# Patient Record
Sex: FEMALE | Race: WHITE | NOT HISPANIC OR LATINO | URBAN - METROPOLITAN AREA
[De-identification: names, ages, dates, MRNs, and addresses within clinical notes are randomized per-mention and may not be internally consistent; named-entity substitution may affect disease eponyms.]

---

## 2022-11-17 ENCOUNTER — INPATIENT (INPATIENT)
Facility: HOSPITAL | Age: 37
LOS: 0 days | Discharge: HOME | DRG: 37 | End: 2022-11-18
Attending: NEUROLOGICAL SURGERY | Admitting: NEUROLOGICAL SURGERY
Payer: MEDICAID

## 2022-11-17 ENCOUNTER — APPOINTMENT (OUTPATIENT)
Dept: NEUROSURGERY | Facility: HOSPITAL | Age: 37
End: 2022-11-17

## 2022-11-17 VITALS
OXYGEN SATURATION: 100 % | SYSTOLIC BLOOD PRESSURE: 107 MMHG | HEART RATE: 77 BPM | RESPIRATION RATE: 13 BRPM | DIASTOLIC BLOOD PRESSURE: 68 MMHG

## 2022-11-17 DIAGNOSIS — Z01.818 ENCOUNTER FOR OTHER PREPROCEDURAL EXAMINATION: ICD-10-CM

## 2022-11-17 DIAGNOSIS — Z29.9 ENCOUNTER FOR PROPHYLACTIC MEASURES, UNSPECIFIED: ICD-10-CM

## 2022-11-17 DIAGNOSIS — B06.00: ICD-10-CM

## 2022-11-17 DIAGNOSIS — I77.74 DISSECTION OF VERTEBRAL ARTERY: ICD-10-CM

## 2022-11-17 DIAGNOSIS — M48.9 SPONDYLOPATHY, UNSPECIFIED: ICD-10-CM

## 2022-11-17 LAB
ANION GAP SERPL CALC-SCNC: 10 MMOL/L — SIGNIFICANT CHANGE UP (ref 5–17)
ANION GAP SERPL CALC-SCNC: 14 MMOL/L — SIGNIFICANT CHANGE UP (ref 5–17)
APTT BLD: 131.2 SEC — CRITICAL HIGH (ref 27.5–35.5)
APTT BLD: 36.5 SEC — HIGH (ref 27.5–35.5)
APTT BLD: 80.7 SEC — HIGH (ref 27.5–35.5)
BLD GP AB SCN SERPL QL: NEGATIVE — SIGNIFICANT CHANGE UP
BUN SERPL-MCNC: 10 MG/DL — SIGNIFICANT CHANGE UP (ref 7–23)
BUN SERPL-MCNC: 13 MG/DL — SIGNIFICANT CHANGE UP (ref 7–23)
CALCIUM SERPL-MCNC: 8.5 MG/DL — SIGNIFICANT CHANGE UP (ref 8.4–10.5)
CALCIUM SERPL-MCNC: 9.7 MG/DL — SIGNIFICANT CHANGE UP (ref 8.4–10.5)
CHLORIDE SERPL-SCNC: 102 MMOL/L — SIGNIFICANT CHANGE UP (ref 96–108)
CHLORIDE SERPL-SCNC: 106 MMOL/L — SIGNIFICANT CHANGE UP (ref 96–108)
CO2 SERPL-SCNC: 22 MMOL/L — SIGNIFICANT CHANGE UP (ref 22–31)
CO2 SERPL-SCNC: 24 MMOL/L — SIGNIFICANT CHANGE UP (ref 22–31)
CREAT SERPL-MCNC: 0.6 MG/DL — SIGNIFICANT CHANGE UP (ref 0.5–1.3)
CREAT SERPL-MCNC: 0.63 MG/DL — SIGNIFICANT CHANGE UP (ref 0.5–1.3)
EGFR: 119 ML/MIN/1.73M2 — SIGNIFICANT CHANGE UP
EGFR: 120 ML/MIN/1.73M2 — SIGNIFICANT CHANGE UP
GLUCOSE SERPL-MCNC: 114 MG/DL — HIGH (ref 70–99)
GLUCOSE SERPL-MCNC: 139 MG/DL — HIGH (ref 70–99)
HCT VFR BLD CALC: 35.1 % — SIGNIFICANT CHANGE UP (ref 34.5–45)
HCT VFR BLD CALC: 38.8 % — SIGNIFICANT CHANGE UP (ref 34.5–45)
HCT VFR BLD CALC: 39.6 % — SIGNIFICANT CHANGE UP (ref 34.5–45)
HGB BLD-MCNC: 11.7 G/DL — SIGNIFICANT CHANGE UP (ref 11.5–15.5)
HGB BLD-MCNC: 12.8 G/DL — SIGNIFICANT CHANGE UP (ref 11.5–15.5)
HGB BLD-MCNC: 12.9 G/DL — SIGNIFICANT CHANGE UP (ref 11.5–15.5)
INR BLD: 1.14 RATIO — SIGNIFICANT CHANGE UP (ref 0.88–1.16)
INR BLD: 1.17 RATIO — HIGH (ref 0.88–1.16)
MAGNESIUM SERPL-MCNC: 2.1 MG/DL — SIGNIFICANT CHANGE UP (ref 1.6–2.6)
MCHC RBC-ENTMCNC: 29.4 PG — SIGNIFICANT CHANGE UP (ref 27–34)
MCHC RBC-ENTMCNC: 29.5 PG — SIGNIFICANT CHANGE UP (ref 27–34)
MCHC RBC-ENTMCNC: 30 PG — SIGNIFICANT CHANGE UP (ref 27–34)
MCHC RBC-ENTMCNC: 32.6 GM/DL — SIGNIFICANT CHANGE UP (ref 32–36)
MCHC RBC-ENTMCNC: 33 GM/DL — SIGNIFICANT CHANGE UP (ref 32–36)
MCHC RBC-ENTMCNC: 33.3 GM/DL — SIGNIFICANT CHANGE UP (ref 32–36)
MCV RBC AUTO: 89.4 FL — SIGNIFICANT CHANGE UP (ref 80–100)
MCV RBC AUTO: 90 FL — SIGNIFICANT CHANGE UP (ref 80–100)
MCV RBC AUTO: 90.2 FL — SIGNIFICANT CHANGE UP (ref 80–100)
NRBC # BLD: 0 /100 WBCS — SIGNIFICANT CHANGE UP (ref 0–0)
PA ADP PRP-ACNC: 157 PRU — LOW (ref 194–417)
PA ADP PRP-ACNC: 195 PRU — SIGNIFICANT CHANGE UP (ref 194–417)
PA ADP PRP-ACNC: 38 PRU — LOW (ref 194–417)
PHOSPHATE SERPL-MCNC: 3.4 MG/DL — SIGNIFICANT CHANGE UP (ref 2.5–4.5)
PLATELET # BLD AUTO: 215 K/UL — SIGNIFICANT CHANGE UP (ref 150–400)
PLATELET # BLD AUTO: 222 K/UL — SIGNIFICANT CHANGE UP (ref 150–400)
PLATELET # BLD AUTO: 250 K/UL — SIGNIFICANT CHANGE UP (ref 150–400)
PLATELET RESPONSE ASPIRIN RESULT: 406 ARU — SIGNIFICANT CHANGE UP (ref 350–700)
PLATELET RESPONSE ASPIRIN RESULT: 587 ARU — SIGNIFICANT CHANGE UP (ref 350–700)
POTASSIUM SERPL-MCNC: 3.8 MMOL/L — SIGNIFICANT CHANGE UP (ref 3.5–5.3)
POTASSIUM SERPL-MCNC: 4.1 MMOL/L — SIGNIFICANT CHANGE UP (ref 3.5–5.3)
POTASSIUM SERPL-SCNC: 3.8 MMOL/L — SIGNIFICANT CHANGE UP (ref 3.5–5.3)
POTASSIUM SERPL-SCNC: 4.1 MMOL/L — SIGNIFICANT CHANGE UP (ref 3.5–5.3)
PROTHROM AB SERPL-ACNC: 13.1 SEC — SIGNIFICANT CHANGE UP (ref 10.5–13.4)
PROTHROM AB SERPL-ACNC: 13.6 SEC — HIGH (ref 10.5–13.4)
RBC # BLD: 3.9 M/UL — SIGNIFICANT CHANGE UP (ref 3.8–5.2)
RBC # BLD: 4.34 M/UL — SIGNIFICANT CHANGE UP (ref 3.8–5.2)
RBC # BLD: 4.39 M/UL — SIGNIFICANT CHANGE UP (ref 3.8–5.2)
RBC # FLD: 12.3 % — SIGNIFICANT CHANGE UP (ref 10.3–14.5)
RBC # FLD: 12.4 % — SIGNIFICANT CHANGE UP (ref 10.3–14.5)
RBC # FLD: 12.7 % — SIGNIFICANT CHANGE UP (ref 10.3–14.5)
RH IG SCN BLD-IMP: POSITIVE — SIGNIFICANT CHANGE UP
SARS-COV-2 RNA SPEC QL NAA+PROBE: SIGNIFICANT CHANGE UP
SODIUM SERPL-SCNC: 138 MMOL/L — SIGNIFICANT CHANGE UP (ref 135–145)
SODIUM SERPL-SCNC: 140 MMOL/L — SIGNIFICANT CHANGE UP (ref 135–145)
UFH PPP CHRO-ACNC: 0.28 IU/ML — LOW (ref 0.3–0.7)
WBC # BLD: 5.96 K/UL — SIGNIFICANT CHANGE UP (ref 3.8–10.5)
WBC # BLD: 6.85 K/UL — SIGNIFICANT CHANGE UP (ref 3.8–10.5)
WBC # BLD: 7.82 K/UL — SIGNIFICANT CHANGE UP (ref 3.8–10.5)
WBC # FLD AUTO: 5.96 K/UL — SIGNIFICANT CHANGE UP (ref 3.8–10.5)
WBC # FLD AUTO: 6.85 K/UL — SIGNIFICANT CHANGE UP (ref 3.8–10.5)
WBC # FLD AUTO: 7.82 K/UL — SIGNIFICANT CHANGE UP (ref 3.8–10.5)

## 2022-11-17 PROCEDURE — 75894 X-RAYS TRANSCATH THERAPY: CPT | Mod: 26

## 2022-11-17 PROCEDURE — 75898 FOLLOW-UP ANGIOGRAPHY: CPT | Mod: 26

## 2022-11-17 PROCEDURE — 36226 PLACE CATH VERTEBRAL ART: CPT | Mod: 50

## 2022-11-17 PROCEDURE — 99223 1ST HOSP IP/OBS HIGH 75: CPT

## 2022-11-17 PROCEDURE — 61626 TCAT PERM OCCLS/EMBOL NONCNS: CPT

## 2022-11-17 PROCEDURE — 99291 CRITICAL CARE FIRST HOUR: CPT

## 2022-11-17 RX ORDER — ACETAMINOPHEN 500 MG
650 TABLET ORAL EVERY 6 HOURS
Refills: 0 | Status: DISCONTINUED | OUTPATIENT
Start: 2022-11-17 | End: 2022-11-18

## 2022-11-17 RX ORDER — OXYCODONE HYDROCHLORIDE 5 MG/1
10 TABLET ORAL EVERY 4 HOURS
Refills: 0 | Status: DISCONTINUED | OUTPATIENT
Start: 2022-11-17 | End: 2022-11-18

## 2022-11-17 RX ORDER — ACETAMINOPHEN 500 MG
1000 TABLET ORAL ONCE
Refills: 0 | Status: COMPLETED | OUTPATIENT
Start: 2022-11-17 | End: 2022-11-18

## 2022-11-17 RX ORDER — ASPIRIN/CALCIUM CARB/MAGNESIUM 324 MG
325 TABLET ORAL ONCE
Refills: 0 | Status: DISCONTINUED | OUTPATIENT
Start: 2022-11-17 | End: 2022-11-17

## 2022-11-17 RX ORDER — HYDROMORPHONE HYDROCHLORIDE 2 MG/ML
0.25 INJECTION INTRAMUSCULAR; INTRAVENOUS; SUBCUTANEOUS
Refills: 0 | Status: DISCONTINUED | OUTPATIENT
Start: 2022-11-17 | End: 2022-11-18

## 2022-11-17 RX ORDER — HEPARIN SODIUM 5000 [USP'U]/ML
1200 INJECTION INTRAVENOUS; SUBCUTANEOUS
Qty: 25000 | Refills: 0 | Status: DISCONTINUED | OUTPATIENT
Start: 2022-11-17 | End: 2022-11-18

## 2022-11-17 RX ORDER — ONDANSETRON 8 MG/1
4 TABLET, FILM COATED ORAL EVERY 6 HOURS
Refills: 0 | Status: DISCONTINUED | OUTPATIENT
Start: 2022-11-17 | End: 2022-11-18

## 2022-11-17 RX ORDER — CLOPIDOGREL BISULFATE 75 MG/1
300 TABLET, FILM COATED ORAL ONCE
Refills: 0 | Status: COMPLETED | OUTPATIENT
Start: 2022-11-17 | End: 2022-11-17

## 2022-11-17 RX ORDER — POLYETHYLENE GLYCOL 3350 17 G/17G
17 POWDER, FOR SOLUTION ORAL DAILY
Refills: 0 | Status: DISCONTINUED | OUTPATIENT
Start: 2022-11-17 | End: 2022-11-18

## 2022-11-17 RX ORDER — CANGRELOR 50 MG/1
2 INJECTION, POWDER, LYOPHILIZED, FOR SOLUTION INTRAVENOUS
Qty: 50 | Refills: 0 | Status: DISCONTINUED | OUTPATIENT
Start: 2022-11-17 | End: 2022-11-18

## 2022-11-17 RX ORDER — CLOPIDOGREL BISULFATE 75 MG/1
600 TABLET, FILM COATED ORAL ONCE
Refills: 0 | Status: COMPLETED | OUTPATIENT
Start: 2022-11-17 | End: 2022-11-17

## 2022-11-17 RX ORDER — ACETAMINOPHEN 500 MG
1000 TABLET ORAL ONCE
Refills: 0 | Status: COMPLETED | OUTPATIENT
Start: 2022-11-17 | End: 2022-11-17

## 2022-11-17 RX ORDER — SODIUM CHLORIDE 9 MG/ML
1000 INJECTION INTRAMUSCULAR; INTRAVENOUS; SUBCUTANEOUS
Refills: 0 | Status: DISCONTINUED | OUTPATIENT
Start: 2022-11-17 | End: 2022-11-18

## 2022-11-17 RX ORDER — SENNA PLUS 8.6 MG/1
2 TABLET ORAL AT BEDTIME
Refills: 0 | Status: DISCONTINUED | OUTPATIENT
Start: 2022-11-17 | End: 2022-11-18

## 2022-11-17 RX ORDER — ASPIRIN/CALCIUM CARB/MAGNESIUM 324 MG
650 TABLET ORAL ONCE
Refills: 0 | Status: COMPLETED | OUTPATIENT
Start: 2022-11-17 | End: 2022-11-17

## 2022-11-17 RX ORDER — OXYCODONE HYDROCHLORIDE 5 MG/1
5 TABLET ORAL EVERY 4 HOURS
Refills: 0 | Status: DISCONTINUED | OUTPATIENT
Start: 2022-11-17 | End: 2022-11-18

## 2022-11-17 RX ORDER — CLOPIDOGREL BISULFATE 75 MG/1
300 TABLET, FILM COATED ORAL ONCE
Refills: 0 | Status: DISCONTINUED | OUTPATIENT
Start: 2022-11-17 | End: 2022-11-17

## 2022-11-17 RX ORDER — CLOPIDOGREL BISULFATE 75 MG/1
75 TABLET, FILM COATED ORAL DAILY
Refills: 0 | Status: DISCONTINUED | OUTPATIENT
Start: 2022-11-17 | End: 2022-11-17

## 2022-11-17 RX ORDER — ASPIRIN/CALCIUM CARB/MAGNESIUM 324 MG
81 TABLET ORAL DAILY
Refills: 0 | Status: DISCONTINUED | OUTPATIENT
Start: 2022-11-17 | End: 2022-11-17

## 2022-11-17 RX ORDER — CANGRELOR 50 MG/1
2 INJECTION, POWDER, LYOPHILIZED, FOR SOLUTION INTRAVENOUS
Qty: 50 | Refills: 0 | Status: DISCONTINUED | OUTPATIENT
Start: 2022-11-17 | End: 2022-11-17

## 2022-11-17 RX ADMIN — Medication 650 MILLIGRAM(S): at 08:34

## 2022-11-17 RX ADMIN — Medication 400 MILLIGRAM(S): at 15:57

## 2022-11-17 RX ADMIN — CANGRELOR 42 MICROGRAM(S)/KG/MIN: 50 INJECTION, POWDER, LYOPHILIZED, FOR SOLUTION INTRAVENOUS at 23:53

## 2022-11-17 RX ADMIN — HEPARIN SODIUM 12 UNIT(S)/HR: 5000 INJECTION INTRAVENOUS; SUBCUTANEOUS at 06:26

## 2022-11-17 RX ADMIN — SODIUM CHLORIDE 75 MILLILITER(S): 9 INJECTION INTRAMUSCULAR; INTRAVENOUS; SUBCUTANEOUS at 19:05

## 2022-11-17 RX ADMIN — Medication 650 MILLIGRAM(S): at 23:57

## 2022-11-17 RX ADMIN — SODIUM CHLORIDE 75 MILLILITER(S): 9 INJECTION INTRAMUSCULAR; INTRAVENOUS; SUBCUTANEOUS at 06:49

## 2022-11-17 RX ADMIN — HEPARIN SODIUM 10 UNIT(S)/HR: 5000 INJECTION INTRAVENOUS; SUBCUTANEOUS at 19:04

## 2022-11-17 RX ADMIN — OXYCODONE HYDROCHLORIDE 5 MILLIGRAM(S): 5 TABLET ORAL at 06:57

## 2022-11-17 RX ADMIN — Medication 650 MILLIGRAM(S): at 22:57

## 2022-11-17 RX ADMIN — Medication 1000 MILLIGRAM(S): at 16:00

## 2022-11-17 RX ADMIN — SODIUM CHLORIDE 75 MILLILITER(S): 9 INJECTION INTRAMUSCULAR; INTRAVENOUS; SUBCUTANEOUS at 23:54

## 2022-11-17 RX ADMIN — CLOPIDOGREL BISULFATE 300 MILLIGRAM(S): 75 TABLET, FILM COATED ORAL at 19:11

## 2022-11-17 RX ADMIN — CLOPIDOGREL BISULFATE 600 MILLIGRAM(S): 75 TABLET, FILM COATED ORAL at 08:35

## 2022-11-17 RX ADMIN — HEPARIN SODIUM 12 UNIT(S)/HR: 5000 INJECTION INTRAVENOUS; SUBCUTANEOUS at 15:43

## 2022-11-17 RX ADMIN — OXYCODONE HYDROCHLORIDE 5 MILLIGRAM(S): 5 TABLET ORAL at 07:30

## 2022-11-17 NOTE — CHART NOTE - NSCHARTNOTEFT_GEN_A_CORE
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 Moreno MD  Fellow: Dr. Farmer   Physician Assistant: Bel Raphael PA-C    RN: Ritesh Benavides: Shaji     Anesthesia: Dr. Jennings    (general anesthesia)    I/Os:  Fluids:  Buckley:  Contrast:  Estimated Blood Loss: <10cc    Preliminary Report:  Under general anesthesia, using a 5Fr short sheath to the right groin examination of left vertebral artery/ left internal carotid artery/ left external carotid artery/ right vertebral artery/ right internal carotid artery/ right external carotid artery via selective cerebral angiography demonstrates ________. ( Official note to follow).    Patient tolerated procedure well, vital signs stable, hemodynamically stable, no change in neurological status compared to baseline. Results discussed with neurosurgery/ patient and their family. Groin sheath d/c'ed, manual compression held to hemostasis, no active bleeding, no hematoma, Vascade applied, quick clot and safeguard balloon dressing applied at _____h. Patient transferred to NSCU for further care/ monitoring. 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 Moreno MD  Fellow: Dr. Farmer   Physician Assistant: Bel Raphael PA-C    RN: Ritesh   Tech: Shaji     Anesthesia: Dr. Rosie Ambrocio     (general anesthesia)    I/Os:  Fluids: 1L  Buckley: 1300cc   Contrast: 169cc   Estimated Blood Loss: <10cc    Preliminary Report:  Under general anesthesia, using a 6Fr sheath to the right groin examination of left vertebral artery/  right vertebral artery via selective cerebral angiography demonstrates bilateral vertebral artery dissection s/p stenting and angioplasty. ( Official note to follow).    Patient tolerated procedure well, vital signs stable, hemodynamically stable, no change in neurological status compared to baseline. Results discussed with neurosurgery/ patient and their family. Groin sheath d/c'ed, manual compression held to hemostasis, no active bleeding, no hematoma, Vascade applied, quick clot and safeguard balloon dressing applied at 11:00h. Patient transferred to NSCU for further care/ monitoring.

## 2022-11-17 NOTE — PROGRESS NOTE ADULT - ASSESSMENT
ASSESSMENT/PLAN:     b/l vert dissection s/p pipeline stent    NEURO:  - Neurochecks q1h  - MRI brain WWO,. MR NOVA  - on ASA/PLAVIX, heparin, cangelor per nsg  - ARU/PRU now  - Pain control  - stroke consult  - Activity: bed rest for now    PULM:  - Incentive spirometry  - mobilize as tolerated  - Aspiration Precautions    CV:  - -140mmHg  - tte, ekg, a1c, lipid panel    RENAL:  - Fluids: IVF until good PO intake  - trend renal function  - d/c kirit in AM    GI:  - Diet: Dysphagia screen and then advance diet as tolerated  - GI prophylaxis: na  - Bowel regimen standing    ENDO:   - Goal euglycemia (-180)    HEME/ONC:  - monitor H/H    VTE prophylaxis   - SCDs   - hold chemoprophylaxis due to: heparin gtt aptt 60-90  - aptt q6h      ID:  - Madhavi-op antibiotics         ASSESSMENT/PLAN:     cerebellar stroke, b/l vert dissection s/p pipeline stent    NEURO:  - Neurochecks q1h  - MRI brain WWO,. MR NOVA  - on ASA/PLAVIX, heparin, cangelor per nsg  - ARU/PRU now  - Pain control  - stroke consult  - Activity: bed rest for now    PULM:  - Incentive spirometry  - mobilize as tolerated  - Aspiration Precautions    CV:  - -140 mmHg  - tte, ekg, a1c, lipid panel    RENAL:  - Fluids: IVF until good PO intake  - trend renal function  - d/c beth in AM    GI:  - Diet: Dysphagia screen and then advance diet as tolerated  - GI prophylaxis: na  - Bowel regimen standing    ENDO:   - Goal euglycemia (-180)    HEME/ONC:  - monitor H/H    VTE prophylaxis   - SCDs   - hold chemoprophylaxis due to: heparin gtt aptt 60-90  - aptt q6h      ID:  - Madhavi-op antibiotics        30 critical care time at risk for cerebellar hemorrhage, stroke

## 2022-11-17 NOTE — CONSULT NOTE ADULT - ASSESSMENT
37F hx chiropractic manipulation xfer from [place] w/ b/l vertevral dissection, planned for further neuro intervention, medicine consult  for Pre-op clearance

## 2022-11-17 NOTE — CHART NOTE - NSCHARTNOTESELECT_GEN_ALL_CORE
VTE risk PA/Event Note
Interventional Neuro Radiology/Event Note
Interventional Neuro Radiology/Event Note

## 2022-11-17 NOTE — PROGRESS NOTE ADULT - SUBJECTIVE AND OBJECTIVE BOX
HPI:  37F hx chiropractic manipulation xfer from [place] w/ b/l vert dissection. Yesterday noticed slurred speech, double vision, L sided numbness/tingling and R side felt "different." Now reports R sided pins/needles. Per patient had CT, angiogram, and MRI at [place] and Dr. Mayer has discs. Exam: AOx3, keep L eye closed to avoid double vision, PERRL, EOMI, no facial, no drift, KU 5/5.        24 HOUR EVENTS:   - POD0 s/p b/l vert dissection s/p pipeline stent            ICU Vital Signs Last 24 Hrs  T(C): 36.2 (17 Nov 2022 11:35), Max: 36.6 (17 Nov 2022 07:25)  T(F): 97.2 (17 Nov 2022 11:35), Max: 97.8 (17 Nov 2022 07:25)  HR: 96 (17 Nov 2022 12:30) (69 - 102)  BP: 103/62 (17 Nov 2022 12:30) (103/62 - 123/68)  BP(mean): 77 (17 Nov 2022 12:30) (77 - 98)  ABP: 104/65 (17 Nov 2022 12:30) (104/65 - 112/63)  ABP(mean): 80 (17 Nov 2022 12:30) (80 - 85)  RR: 16 (17 Nov 2022 12:30) (13 - 19)  SpO2: 97% (17 Nov 2022 12:30) (95% - 100%)    O2 Parameters below as of 17 Nov 2022 12:30  Patient On (Oxygen Delivery Method): room air           11-16 @ 07:01 - 11-17 @ 07:00  --------------------------------------------------------  IN: 514 mL / OUT: 0 mL / NET: 514 mL    11-17 @ 07:01 - 11-17 @ 13:19  --------------------------------------------------------  IN: 54 mL / OUT: 60 mL / NET: -6 mL                             12.8   5.96  )-----------( 250      ( 17 Nov 2022 02:14 )             38.8    11-17    140  |  102  |  13  ----------------------------<  114<H>  3.8   |  24  |  0.60    Ca    9.7      17 Nov 2022 04:42    TPro  6.9  /  Alb  4.1  /  TBili  0.3  /  DBili  <0.1  /  AST  16  /  ALT  16  /  AlkPhos  77  11-17              MEDICATIONS:  acetaminophen     Tablet .. 650 milliGRAM(s) Oral every 6 hours PRN  cangrelor Infusion 2 MICROgram(s)/kG/Min IV Continuous <Continuous>  heparin  Infusion 1200 Unit(s)/Hr IV Continuous <Continuous>  HYDROmorphone  Injectable 0.25 milliGRAM(s) IV Push every 10 minutes PRN  ondansetron Injectable 4 milliGRAM(s) IV Push every 6 hours PRN  oxyCODONE    IR 5 milliGRAM(s) Oral every 4 hours PRN  oxyCODONE    IR 10 milliGRAM(s) Oral every 4 hours PRN  polyethylene glycol 3350 17 Gram(s) Oral daily  senna 2 Tablet(s) Oral at bedtime  sodium chloride 0.9%. 1000 milliLiter(s) IV Continuous <Continuous>            PHYSICAL EXAM:    General: calm  CVS: RRR  Pulm: CTAB  GI: Soft, NTND  Extremities: No LE Edema  Neuro: AOx3, keeps L eye closed to resolve diplopia, slurred speech/dysarthria, follows commands, eomi, 3mm reactive b/l, no facial, LUE dysmetria, rest 5/5  Skin: no groin hematoma, +dp                 HPI:  37F hx chiropractic manipulation xfer from [place] w/ b/l vert dissection. Yesterday noticed slurred speech, double vision, L sided numbness/tingling and R side felt "different." Now reports R sided pins/needles. Per patient had CT, angiogram, and MRI at [place] and Dr. Mayer has discs. Exam: AOx3, keep L eye closed to avoid double vision, PERRL, EOMI, no facial, no drift, KU 5/5.        24 HOUR EVENTS:   - POD0 s/p b/l vert dissection s/p pipeline stent            ICU Vital Signs Last 24 Hrs  T(C): 36.2 (17 Nov 2022 11:35), Max: 36.6 (17 Nov 2022 07:25)  T(F): 97.2 (17 Nov 2022 11:35), Max: 97.8 (17 Nov 2022 07:25)  HR: 96 (17 Nov 2022 12:30) (69 - 102)  BP: 103/62 (17 Nov 2022 12:30) (103/62 - 123/68)  BP(mean): 77 (17 Nov 2022 12:30) (77 - 98)  ABP: 104/65 (17 Nov 2022 12:30) (104/65 - 112/63)  ABP(mean): 80 (17 Nov 2022 12:30) (80 - 85)  RR: 16 (17 Nov 2022 12:30) (13 - 19)  SpO2: 97% (17 Nov 2022 12:30) (95% - 100%)    O2 Parameters below as of 17 Nov 2022 12:30  Patient On (Oxygen Delivery Method): room air           11-16 @ 07:01 - 11-17 @ 07:00  --------------------------------------------------------  IN: 514 mL / OUT: 0 mL / NET: 514 mL    11-17 @ 07:01 - 11-17 @ 13:19  --------------------------------------------------------  IN: 54 mL / OUT: 60 mL / NET: -6 mL                             12.8   5.96  )-----------( 250      ( 17 Nov 2022 02:14 )             38.8    11-17    140  |  102  |  13  ----------------------------<  114<H>  3.8   |  24  |  0.60    Ca    9.7      17 Nov 2022 04:42    TPro  6.9  /  Alb  4.1  /  TBili  0.3  /  DBili  <0.1  /  AST  16  /  ALT  16  /  AlkPhos  77  11-17              MEDICATIONS:  acetaminophen     Tablet .. 650 milliGRAM(s) Oral every 6 hours PRN  cangrelor Infusion 2 MICROgram(s)/kG/Min IV Continuous <Continuous>  heparin  Infusion 1200 Unit(s)/Hr IV Continuous <Continuous>  HYDROmorphone  Injectable 0.25 milliGRAM(s) IV Push every 10 minutes PRN  ondansetron Injectable 4 milliGRAM(s) IV Push every 6 hours PRN  oxyCODONE    IR 5 milliGRAM(s) Oral every 4 hours PRN  oxyCODONE    IR 10 milliGRAM(s) Oral every 4 hours PRN  polyethylene glycol 3350 17 Gram(s) Oral daily  senna 2 Tablet(s) Oral at bedtime  sodium chloride 0.9%. 1000 milliLiter(s) IV Continuous <Continuous>            PHYSICAL EXAM:    General: calm  CVS: RRR  Pulm: CTAB  GI: Soft, NTND  Extremities: No LE Edema  Neuro: AOx3, diplopia, slurred speech/dysarthria, follows commands, eomi, 3mm reactive b/l, no facial, LUE dysmetria, rest 5/5  Skin: no groin hematoma, +dp      LABS:  Na: 140 (11-17 @ 04:42)  K: 3.8 (11-17 @ 04:42)  Cl: 102 (11-17 @ 04:42)  CO2: 24 (11-17 @ 04:42)  BUN: 13 (11-17 @ 04:42)  Cr: 0.60 (11-17 @ 04:42)  Glu: 114(11-17 @ 04:42)    Hgb: 12.8 (11-17 @ 02:14)  Hct: 38.8 (11-17 @ 02:14)  WBC: 5.96 (11-17 @ 02:14)  Plt: 250 (11-17 @ 02:14)    INR: 1.14 11-17-22 @ 02:14  PTT: 36.5 11-17-22 @ 02:14

## 2022-11-17 NOTE — H&P ADULT - ASSESSMENT
37F hx chiropractic manipulation xfer from [place] w/ b/l vert dissection. Yesterday noticed slurred speech, double vision, L sided numbness/tingling and R side felt "different." Now reports R sided pins/needles. Per patient had CT, angiogram, and MRI at [place] and Dr. Mayer has discs. Exam: AOx3, keep L eye closed to avoid double vision, PERRL, EOMI, no facial, no drift, KU 5/5.  -Adm stroke unit under Dr. Mayer, q2h neuro checks  -Preop angio/stent in AM  -Gave loading dose ASA, plavix 37F hx chiropractic manipulation xfer from [place] w/ b/l vert dissection. Yesterday noticed slurred speech, double vision, L sided numbness/tingling and R side felt "different." Now reports R sided pins/needles. Per patient had CT, angiogram, and MRI at [place] and Dr. Mayer has discs. Exam: AOx3, keep L eye closed to avoid double vision, PERRL, EOMI, no facial, no drift, KU 5/5.  -Adm stroke unit under Dr. Mayer, q2h neuro checks  -Preop angio/stent in AM  -Cont hep gtt, PTT goal 60-80  -Will discuss loading dose ASA, plavix w/ attending in AM

## 2022-11-17 NOTE — PROGRESS NOTE ADULT - SUBJECTIVE AND OBJECTIVE BOX
HPI:  37F hx chiropractic manipulation xfer from [place] w/ b/l vert dissection. Yesterday noticed slurred speech, double vision, L sided numbness/tingling and R side felt "different." Now reports R sided pins/needles. Per patient had CT, angiogram, and MRI at [place] and Dr. Mayer has discs. Exam: AOx3, keep L eye closed to avoid double vision, PERRL, EOMI, no facial, no drift, KU 5/5.        24 HOUR EVENTS:   - POD0 s/p b/l vert dissection s/p pipeline stent      ICU Vital Signs Last 24 Hrs  T(C): 36.2 (17 Nov 2022 11:35), Max: 36.6 (17 Nov 2022 07:25)  T(F): 97.2 (17 Nov 2022 11:35), Max: 97.8 (17 Nov 2022 07:25)  HR: 88 (17 Nov 2022 18:00) (69 - 102)  BP: 103/62 (17 Nov 2022 12:30) (103/62 - 123/68)  BP(mean): 77 (17 Nov 2022 12:30) (77 - 98)  ABP: 110/70 (17 Nov 2022 18:00) (102/62 - 137/86)  ABP(mean): 86 (17 Nov 2022 18:00) (78 - 106)  RR: 16 (17 Nov 2022 18:00) (13 - 19)  SpO2: 96% (17 Nov 2022 18:00) (94% - 100%)    O2 Parameters below as of 17 Nov 2022 18:00  Patient On (Oxygen Delivery Method): room air    I&O's Summary    16 Nov 2022 07:01  -  17 Nov 2022 07:00  --------------------------------------------------------  IN: 514 mL / OUT: 0 mL / NET: 514 mL    17 Nov 2022 07:01  -  17 Nov 2022 19:24  --------------------------------------------------------  IN: 948 mL / OUT: 440 mL / NET: 508 mL    MEDICATIONS  (STANDING):  cangrelor Infusion 2 MICROgram(s)/kG/Min (42 mL/Hr) IV Continuous <Continuous>  heparin  Infusion 1200 Unit(s)/Hr (10 mL/Hr) IV Continuous <Continuous>  polyethylene glycol 3350 17 Gram(s) Oral daily  senna 2 Tablet(s) Oral at bedtime  sodium chloride 0.9%. 1000 milliLiter(s) (75 mL/Hr) IV Continuous <Continuous>    MEDICATIONS  (PRN):  acetaminophen     Tablet .. 650 milliGRAM(s) Oral every 6 hours PRN Temp greater or equal to 38C (100.4F), Mild Pain (1 - 3)  HYDROmorphone  Injectable 0.25 milliGRAM(s) IV Push every 10 minutes PRN Moderate Pain (4 - 6)  ondansetron Injectable 4 milliGRAM(s) IV Push every 6 hours PRN Nausea and/or Vomiting  oxyCODONE    IR 5 milliGRAM(s) Oral every 4 hours PRN Moderate Pain (4 - 6)  oxyCODONE    IR 10 milliGRAM(s) Oral every 4 hours PRN Severe Pain (7 - 10)        PHYSICAL EXAM:    General: calm  CVS: RRR  Pulm: CTAB  GI: Soft, NTND  Extremities: No LE Edema  Neuro: AOx3, diplopia, slurred speech/dysarthria, follows commands, eomi, 3mm reactive b/l, no facial, LUE dysmetria, rest 5/5  Skin: no groin hematoma, +dp                     HPI:  37F hx chiropractic manipulation xfer from [place] w/ b/l vert dissection. Yesterday noticed slurred speech, double vision, L sided numbness/tingling and R side felt "different." Now reports R sided pins/needles. Per patient had CT, angiogram, and MRI at [place] and Dr. Mayer has discs. Exam: AOx3, keep L eye closed to avoid double vision, PERRL, EOMI, no facial, no drift, KU 5/5.        24 HOUR EVENTS:   - POD0 s/p b/l vert dissection s/p pipeline stent      ICU Vital Signs Last 24 Hrs  T(C): 36.2 (17 Nov 2022 11:35), Max: 36.6 (17 Nov 2022 07:25)  T(F): 97.2 (17 Nov 2022 11:35), Max: 97.8 (17 Nov 2022 07:25)  HR: 88 (17 Nov 2022 18:00) (69 - 102)  BP: 103/62 (17 Nov 2022 12:30) (103/62 - 123/68)  BP(mean): 77 (17 Nov 2022 12:30) (77 - 98)  ABP: 110/70 (17 Nov 2022 18:00) (102/62 - 137/86)  ABP(mean): 86 (17 Nov 2022 18:00) (78 - 106)  RR: 16 (17 Nov 2022 18:00) (13 - 19)  SpO2: 96% (17 Nov 2022 18:00) (94% - 100%)    O2 Parameters below as of 17 Nov 2022 18:00  Patient On (Oxygen Delivery Method): room air    I&O's Summary    16 Nov 2022 07:01  -  17 Nov 2022 07:00  --------------------------------------------------------  IN: 514 mL / OUT: 0 mL / NET: 514 mL    17 Nov 2022 07:01  -  17 Nov 2022 19:24  --------------------------------------------------------  IN: 948 mL / OUT: 440 mL / NET: 508 mL    MEDICATIONS  (STANDING):  cangrelor Infusion 2 MICROgram(s)/kG/Min (42 mL/Hr) IV Continuous <Continuous>  heparin  Infusion 1200 Unit(s)/Hr (10 mL/Hr) IV Continuous <Continuous>  polyethylene glycol 3350 17 Gram(s) Oral daily  senna 2 Tablet(s) Oral at bedtime  sodium chloride 0.9%. 1000 milliLiter(s) (75 mL/Hr) IV Continuous <Continuous>    MEDICATIONS  (PRN):  acetaminophen     Tablet .. 650 milliGRAM(s) Oral every 6 hours PRN Temp greater or equal to 38C (100.4F), Mild Pain (1 - 3)  HYDROmorphone  Injectable 0.25 milliGRAM(s) IV Push every 10 minutes PRN Moderate Pain (4 - 6)  ondansetron Injectable 4 milliGRAM(s) IV Push every 6 hours PRN Nausea and/or Vomiting  oxyCODONE    IR 5 milliGRAM(s) Oral every 4 hours PRN Moderate Pain (4 - 6)  oxyCODONE    IR 10 milliGRAM(s) Oral every 4 hours PRN Severe Pain (7 - 10)        PHYSICAL EXAM:    General: calm  CVS: RRR  Pulm: CTAB  GI: Soft, NTND  Extremities: No LE Edema  Neuro: AOx3, diplopia, slurred speech/dysarthria, follows commands, eomi, 3mm reactive b/l, no facial, LUE dysmetria worse than right upper extremity, paresthesia of the right sided body (face, arm, leg), rest 5/5  Skin: no groin hematoma, +dp                     HPI:  37F hx chiropractic manipulation xfer from [place] w/ b/l vert dissection. Yesterday noticed slurred speech, double vision, L sided numbness/tingling and R side felt "different." Now reports R sided pins/needles. Per patient had CT, angiogram, and MRI at [place] and Dr. Mayer has discs. Exam: AOx3, keep L eye closed to avoid double vision, PERRL, EOMI, no facial, no drift, KU 5/5.      24 HOUR EVENTS:   - POD0 s/p b/l vert dissection s/p pipeline stent  - Patient was loaded with 1 more dose of plavix after subtherapeutic levels, on cangrelor gtt. Will obtain another P2Y12 off cangrelor this AM      ICU Vital Signs Last 24 Hrs  T(C): 36.2 (17 Nov 2022 11:35), Max: 36.6 (17 Nov 2022 07:25)  T(F): 97.2 (17 Nov 2022 11:35), Max: 97.8 (17 Nov 2022 07:25)  HR: 88 (17 Nov 2022 18:00) (69 - 102)  BP: 103/62 (17 Nov 2022 12:30) (103/62 - 123/68)  BP(mean): 77 (17 Nov 2022 12:30) (77 - 98)  ABP: 110/70 (17 Nov 2022 18:00) (102/62 - 137/86)  ABP(mean): 86 (17 Nov 2022 18:00) (78 - 106)  RR: 16 (17 Nov 2022 18:00) (13 - 19)  SpO2: 96% (17 Nov 2022 18:00) (94% - 100%)    O2 Parameters below as of 17 Nov 2022 18:00  Patient On (Oxygen Delivery Method): room air    I&O's Summary    16 Nov 2022 07:01  -  17 Nov 2022 07:00  --------------------------------------------------------  IN: 514 mL / OUT: 0 mL / NET: 514 mL    17 Nov 2022 07:01  -  17 Nov 2022 19:24  --------------------------------------------------------  IN: 948 mL / OUT: 440 mL / NET: 508 mL    MEDICATIONS  (STANDING):  cangrelor Infusion 2 MICROgram(s)/kG/Min (42 mL/Hr) IV Continuous <Continuous>  heparin  Infusion 1200 Unit(s)/Hr (10 mL/Hr) IV Continuous <Continuous>  polyethylene glycol 3350 17 Gram(s) Oral daily  senna 2 Tablet(s) Oral at bedtime  sodium chloride 0.9%. 1000 milliLiter(s) (75 mL/Hr) IV Continuous <Continuous>    MEDICATIONS  (PRN):  acetaminophen     Tablet .. 650 milliGRAM(s) Oral every 6 hours PRN Temp greater or equal to 38C (100.4F), Mild Pain (1 - 3)  HYDROmorphone  Injectable 0.25 milliGRAM(s) IV Push every 10 minutes PRN Moderate Pain (4 - 6)  ondansetron Injectable 4 milliGRAM(s) IV Push every 6 hours PRN Nausea and/or Vomiting  oxyCODONE    IR 5 milliGRAM(s) Oral every 4 hours PRN Moderate Pain (4 - 6)  oxyCODONE    IR 10 milliGRAM(s) Oral every 4 hours PRN Severe Pain (7 - 10)        PHYSICAL EXAM:    General: calm  CVS: RRR  Pulm: CTAB  GI: Soft, NTND  Extremities: No LE Edema  Neuro: AOx3, diplopia, slurred speech/dysarthria, follows commands, eomi, 3mm reactive b/l, no facial, LUE dysmetria worse than right upper extremity, paresthesia of the right sided body (face, arm, leg), rest 5/5  Skin: no groin hematoma, +dp

## 2022-11-17 NOTE — H&P ADULT - HISTORY OF PRESENT ILLNESS
37F hx chiropractic manipulation xfer from [place] w/ b/l vert dissection. Yesterday noticed slurred speech, double vision, L sided numbness/tingling and R side felt "different." Now reports R sided pins/needles. Per patient had CT, angiogram, and MRI at [place] and Dr. Mayer has discs. Exam: AOx3, keep L eye closed to avoid double vision, PERRL, EOMI, no facial, no drift, KU 5/5.    ICU Vital Signs Last 24 Hrs  T(C): --  T(F): --  HR: --  BP: --  BP(mean): --  ABP: --  ABP(mean): --  RR: --  SpO2: --

## 2022-11-17 NOTE — CONSULT NOTE ADULT - PROBLEM SELECTOR RECOMMENDATION 2
Presents as transfer from [place] w/ constellation of neurologic findings   - Admitted to stroke unit    - further management per neuro    - Neuro checks per unit protocol   - Planned for Preop angio/stent in AM 37F no PMH p/w constellation of neurologic findings  Found to have b/l vertebral artery dissection  - No hx/o coagulopathy, no pertinent family hx  - Denies coagulopathy, no recent travel, illness, tick bite, trauma   - No prior reaction to anesthesia  - Able to perform > 4mets   - RCI 0, Class I risk   - Tele showing NSR  - cbc wnl  - cmp pending  - COVID obtained, results pending   - Upreg obtained results pending  - Active T&S   - If all pending blood work are norm, Pt medically cleared for procedure

## 2022-11-17 NOTE — CONSULT NOTE ADULT - PROBLEM SELECTOR RECOMMENDATION 9
37F no PMH p/w constellation of neurologic findings  Found to have b/l vertebral dissection  - No hx/o coagulopathy, no pertinent family hx  - Denies coagulopathy, no recent travel, illness, tick bite, trauma   - No prior reaction to anesthesia  - Able to perform > 4mets   - Tele showing NSR  - cbc wnl  - cmp pending  - COVID obtained, results pending   - Upreg obtained results pending  - Active T&S   - If all pending blood work are norm, Pt medically cleared for procedure 37F no PMH p/w constellation of neurologic findings  Found to have b/l vertebral dissection  - No hx/o coagulopathy, no pertinent family hx  - Denies coagulopathy, no recent travel, illness, tick bite, trauma   - No prior reaction to anesthesia  - Able to perform > 4mets   - RCI 0, Class I risk   - Tele showing NSR  - cbc wnl  - cmp pending  - COVID obtained, results pending   - Upreg obtained results pending  - Active T&S   - If all pending blood work are norm, Pt medically cleared for procedure Presents as transfer from [place] w/ constellation of neurologic findings   - Admitted to stroke unit    - further management per neuro    - Neuro checks per unit protocol   - Planned for Preop angio/stent in AM

## 2022-11-17 NOTE — CHART NOTE - NSCHARTNOTEFT_GEN_A_CORE
CAPRINI SCORE [CLOT]    AGE RELATED RISK FACTORS                                                       MOBILITY RELATED FACTORS  [ ] Age 41-60 years                                            (1 Point)                  [ ] Bed rest                                                        (1 Point)  [ ] Age: 61-74 years                                           (2 Points)                 [ ] Plaster cast                                                   (2 Points)  [ ] Age= 75 years                                              (3 Points)                 [ ] Bed bound for more than 72 hours                 (2 Points)    DISEASE RELATED RISK FACTORS                                               GENDER SPECIFIC FACTORS  [ ] Edema in the lower extremities                       (1 Point)                  [ ] Pregnancy                                                     (1 Point)  [ ] Varicose veins                                               (1 Point)                  [ ] Post-partum < 6 weeks                                   (1 Point)             [ ] BMI > 25 Kg/m2                                            (1 Point)                  [ ] Hormonal therapy  or oral contraception          (1 Point)                 [ ] Sepsis (in the previous month)                        (1 Point)                  [ ] History of pregnancy complications                 (1 point)  [ ] Pneumonia or serious lung disease                                               [ ] Unexplained or recurrent                     (1 Point)           (in the previous month)                               (1 Point)  [ ] Abnormal pulmonary function test                     (1 Point)                 SURGERY RELATED RISK FACTORS  [ ] Acute myocardial infarction                              (1 Point)                 [ ]  Section                                             (1 Point)  [ ] Congestive heart failure (in the previous month)  (1 Point)               [ ] Minor surgery                                                  (1 Point)   [ ] Inflammatory bowel disease                             (1 Point)                 [ ] Arthroscopic surgery                                        (2 Points)  [ ] Central venous access                                      (2 Points)                [ ] General surgery lasting more than 45 minutes   (2 Points)       [ ] Stroke (in the previous month)                          (5 Points)               [ ] Elective arthroplasty                                         (5 Points)                                                                                                                                               HEMATOLOGY RELATED FACTORS                                                 TRAUMA RELATED RISK FACTORS  [ ] Prior episodes of VTE                                     (3 Points)                [ ] Fracture of the hip, pelvis, or leg                       (5 Points)  [ ] Positive family history for VTE                         (3 Points)                 [ ] Acute spinal cord injury (in the previous month)  (5 Points)  [ ] Prothrombin 53534 A                                     (3 Points)                 [ ] Paralysis  (less than 1 month)                             (5 Points)  [ ] Factor V Leiden                                             (3 Points)                  [ ] Multiple Trauma within 1 month                        (5 Points)  [ ] Lupus anticoagulants                                     (3 Points)                                                           [ ] Anticardiolipin antibodies                               (3 Points)                                                       [ ] High homocysteine in the blood                      (3 Points)                                             [ ] Other congenital or acquired thrombophilia      (3 Points)                                                [ ] Heparin induced thrombocytopenia                  (3 Points)                                          Total Score [  0        ]    Caprini Score 0 - 2:  Low Risk, No VTE Prophylaxis required for most patients, encourage ambulation  Caprini Score 3 - 6:  At Risk, pharmacologic VTE prophylaxis is indicated for most patients (in the absence of a contraindication)  Caprini Score Greater than or = 7:  High Risk, pharmacologic VTE prophylaxis is indicated for most patients (in the absence of a contraindication)

## 2022-11-17 NOTE — CONSULT NOTE ADULT - SUBJECTIVE AND OBJECTIVE BOX
MEDICINE CONSULT NOTE    [facility] Division of Hospital Medicine  Adam Terrell MD  Av[employer]ible on [employer] Teams     HPI:  37F hx chiropractic manipulation xfer from [place] w/ b/l vert dissection. Yesterday noticed slurred speech, double vision, L sided numbness/tingling and R side felt "different." Now reports R sided pins/needles. Per patient had CT, angiogram, and MRI at [place] and Dr. Mayer has discs.    Medicine consult request for Pre-op clearance    At time of encounter, Pt laying comfortably in bed NAD, VSS    Continues to endorse slurred speech, double vision,  L sided numbness/tingling and R side felt "different."  Denies any trauma, recent travel, tick bite, fever, chills, CP, SOB, palpitation       PAST MEDICAL & SURGICAL HISTORY:  Denies PMH    S/p      Review of Systems:   CONSTITUTIONAL: No fever, chills  HEENT: No sore throat, reports double vision   RESPIRATORY: No cough, wheezing, shortness of breath  CARDIOVASCULAR: No chest pain, palpitations, leg edema  GASTROINTESTINAL: No abdominal pain, nausea, vomiting, diarrhea or constipation  GENITOURINARY: No dysuria, frequency, hematuria  NEUROLOGICAL: +slurred speech, +double vision, L sided numbness/tingling  R side feels "different."  SKIN: No itching, burning, rashes, or lesions   MUSCULOSKELETAL: No joint pain or swelling; No muscle, back, or extremity pain  PSYCHIATRIC: No depression, anxiety  HEME/LYMPH: No easy bruising, or bleeding gums      Allergies    No Known Allergies    Intolerances        Social History:   Denies S/A/D    FAMILY HISTORY:  Noncontributory       MEDICATIONS  (STANDING):  aspirin 325 milliGRAM(s) Oral once  clopidogrel Tablet 300 milliGRAM(s) Oral once  polyethylene glycol 3350 17 Gram(s) Oral daily  senna 2 Tablet(s) Oral at bedtime  sodium chloride 0.9%. 1000 milliLiter(s) (75 mL/Hr) IV Continuous <Continuous>    MEDICATIONS  (PRN):  acetaminophen     Tablet .. 650 milliGRAM(s) Oral every 6 hours PRN Temp greater or equal to 38C (100.4F), Mild Pain (1 - 3)  ondansetron Injectable 4 milliGRAM(s) IV Push every 6 hours PRN Nausea and/or Vomiting  oxyCODONE    IR 5 milliGRAM(s) Oral every 4 hours PRN Moderate Pain (4 - 6)  oxyCODONE    IR 10 milliGRAM(s) Oral every 4 hours PRN Severe Pain (7 - 10)        CAPILLARY BLOOD GLUCOSE        I&O's Summary      Physical Exam:  Vital Signs Last 24 Hrs  T(C): 36.2 (2022 02:00), Max: 36.2 (2022 02:00)  T(F): 97.1 (2022 02:00), Max: 97.1 (2022 02:00)  HR: 69 (2022 02:00) (69 - 77)  BP: 108/71 (2022 02:00) (107/68 - 108/71)  BP(mean): 83 (2022 02:00) (81 - 83)  RR: 17 (2022 02:00) (13 - 17)  SpO2: 97% (2022 02:00) (97% - 100%)    Parameters below as of 2022 02:00  Patient On (Oxygen Delivery Method): room air        CONSTITUTIONAL: NAD, well-groomed  EYES: PERRLA; conjunctiva and sclera clear, reports double vision so keeps left eye closed   ENMT: Moist oral mucosa, no pharyngeal injection or exudates; normal dentition  NECK: Supple, no palpable masses; no thyromegaly  RESPIRATORY: Normal respiratory effort; lungs are clear to auscultation bilaterally  CARDIOVASCULAR: RRR, normal S1/ S2, no M/R/G; No LE edema, 2+ peripheral pulse b/L  ABDOMEN: Nontender to palpation, normoactive bowel sounds, no rebound/guarding  MUSCULOSKELETAL:  No clubbing or cyanosis of digits; no joint swelling, no TTP   PSYCH: A+O to person, place, and time; affect appropriate  NEUROLOGY: +slurred speech, +double vision, L sided numbness/tingling  R side feels "different", strength 5/5 throughout, sensation intact, no facial drop   SKIN: No rashes; no palpable lesions    LABS:                        12.8   5.96  )-----------( 250      ( 2022 02:14 )             38.8           PT/INR - ( 2022 02:14 )   PT: 13.1 sec;   INR: 1.14 ratio         PTT - ( 2022 02:14 )  PTT:36.5 sec              RADIOLOGY & ADDITIONAL TESTS:  New Results Reviewed Today:   New Imaging Personally Reviewed Today:  New Electrocardiogram Personally Reviewed Today:  Prior or Outpatient Records Reviewed Today:    COMMUNICATION:  Care Discussed with Consultants/Other Providers and Details of Discussion:  Discussions with Patient/Family:  PCP Communication:

## 2022-11-17 NOTE — CHART NOTE - NSCHARTNOTEFT_GEN_A_CORE
Interventional Neuro Radiology  Pre-Procedure Note    This is a 37F hx chiropractic manipulation transfer from [place] w/ b/l vert dissection. Patient noticed slurred speech, double vision, L sided numbness/tingling and R side felt "different." Now reports R sided pins/needles. Per patient had CT, angiogram, and MRI at [place] and Dr. Mayer has discs. Patient presents now to neuro IR for selective cerebral angiogram and bilateral vertebral artery stenting.     Neuro Exam: AOx3, keep L eye closed to avoid double vision, PERRL, EOMI, no facial, no drift, KU 5/5    PAST MEDICAL & SURGICAL HISTORY:  None     Social History:   Denies tobacco use    FAMILY HISTORY:  No pertinent family history    Allergies:   No Known Allergies    Current Medications:   acetaminophen     Tablet .. 650 milliGRAM(s) Oral every 6 hours PRN  heparin  Infusion 1200 Unit(s)/Hr IV Continuous <Continuous>  ondansetron Injectable 4 milliGRAM(s) IV Push every 6 hours PRN  oxyCODONE    IR 5 milliGRAM(s) Oral every 4 hours PRN  oxyCODONE    IR 10 milliGRAM(s) Oral every 4 hours PRN  polyethylene glycol 3350 17 Gram(s) Oral daily  senna 2 Tablet(s) Oral at bedtime  sodium chloride 0.9%. 1000 milliLiter(s) IV Continuous <Continuous>      Labs:                         12.8   5.96  )-----------( 250      ( 17 Nov 2022 02:14 )             38.8       11-17    140  |  102  |  13  ----------------------------<  114<H>  3.8   |  24  |  0.60    Ca    9.7      17 Nov 2022 04:42    TPro  6.9  /  Alb  4.1  /  TBili  0.3  /  DBili  <0.1  /  AST  16  /  ALT  16  /  AlkPhos  77  11-17      HCG: negative     Blood Bank: 11-17-22  O  --  Positive      Assessment/Plan:   This is a 36yo female  presents with bilateral vertebral artery dissection. Patient presents to neuro-IR for selective cerebral angiography and stenting. Procedure/ risks/ benefits/ goals/ alternatives were explained. Risks include but are not limited to stroke/ vessel injury/ hemorrhage/ groin hematoma. All questions answered. Informed content obtained from patient. Consent placed in chart.

## 2022-11-18 ENCOUNTER — TRANSCRIPTION ENCOUNTER (OUTPATIENT)
Age: 37
End: 2022-11-18

## 2022-11-18 VITALS
OXYGEN SATURATION: 99 % | RESPIRATION RATE: 16 BRPM | HEART RATE: 89 BPM | SYSTOLIC BLOOD PRESSURE: 131 MMHG | DIASTOLIC BLOOD PRESSURE: 79 MMHG | TEMPERATURE: 98 F

## 2022-11-18 PROBLEM — Z00.00 ENCOUNTER FOR PREVENTIVE HEALTH EXAMINATION: Status: ACTIVE | Noted: 2022-11-18

## 2022-11-18 LAB
APTT BLD: 75.7 SEC — HIGH (ref 27.5–35.5)
HCT VFR BLD CALC: 35.5 % — SIGNIFICANT CHANGE UP (ref 34.5–45)
HGB BLD-MCNC: 11.8 G/DL — SIGNIFICANT CHANGE UP (ref 11.5–15.5)
MCHC RBC-ENTMCNC: 29.8 PG — SIGNIFICANT CHANGE UP (ref 27–34)
MCHC RBC-ENTMCNC: 33.2 GM/DL — SIGNIFICANT CHANGE UP (ref 32–36)
MCV RBC AUTO: 89.6 FL — SIGNIFICANT CHANGE UP (ref 80–100)
NRBC # BLD: 0 /100 WBCS — SIGNIFICANT CHANGE UP (ref 0–0)
PA ADP PRP-ACNC: 190 PRU — LOW (ref 194–417)
PA ADP PRP-ACNC: 47 PRU — LOW (ref 194–417)
PLATELET # BLD AUTO: 218 K/UL — SIGNIFICANT CHANGE UP (ref 150–400)
RBC # BLD: 3.96 M/UL — SIGNIFICANT CHANGE UP (ref 3.8–5.2)
RBC # FLD: 12.6 % — SIGNIFICANT CHANGE UP (ref 10.3–14.5)
WBC # BLD: 7.58 K/UL — SIGNIFICANT CHANGE UP (ref 3.8–10.5)
WBC # FLD AUTO: 7.58 K/UL — SIGNIFICANT CHANGE UP (ref 3.8–10.5)

## 2022-11-18 PROCEDURE — U0005: CPT

## 2022-11-18 PROCEDURE — C9460: CPT

## 2022-11-18 PROCEDURE — 85520 HEPARIN ASSAY: CPT

## 2022-11-18 PROCEDURE — 97165 OT EVAL LOW COMPLEX 30 MIN: CPT

## 2022-11-18 PROCEDURE — C1725: CPT

## 2022-11-18 PROCEDURE — 70544 MR ANGIOGRAPHY HEAD W/O DYE: CPT

## 2022-11-18 PROCEDURE — C1894: CPT

## 2022-11-18 PROCEDURE — 97162 PT EVAL MOD COMPLEX 30 MIN: CPT

## 2022-11-18 PROCEDURE — 85730 THROMBOPLASTIN TIME PARTIAL: CPT

## 2022-11-18 PROCEDURE — 84100 ASSAY OF PHOSPHORUS: CPT

## 2022-11-18 PROCEDURE — 75898 FOLLOW-UP ANGIOGRAPHY: CPT

## 2022-11-18 PROCEDURE — 36415 COLL VENOUS BLD VENIPUNCTURE: CPT

## 2022-11-18 PROCEDURE — 85610 PROTHROMBIN TIME: CPT

## 2022-11-18 PROCEDURE — C1760: CPT

## 2022-11-18 PROCEDURE — 85027 COMPLETE CBC AUTOMATED: CPT

## 2022-11-18 PROCEDURE — 70551 MRI BRAIN STEM W/O DYE: CPT

## 2022-11-18 PROCEDURE — 80048 BASIC METABOLIC PNL TOTAL CA: CPT

## 2022-11-18 PROCEDURE — 36226 PLACE CATH VERTEBRAL ART: CPT

## 2022-11-18 PROCEDURE — 80076 HEPATIC FUNCTION PANEL: CPT

## 2022-11-18 PROCEDURE — C1887: CPT

## 2022-11-18 PROCEDURE — 83735 ASSAY OF MAGNESIUM: CPT

## 2022-11-18 PROCEDURE — 70544 MR ANGIOGRAPHY HEAD W/O DYE: CPT | Mod: 26,59

## 2022-11-18 PROCEDURE — 61624 TCAT PERM OCCLS/EMBOLJ CNS: CPT

## 2022-11-18 PROCEDURE — C1769: CPT

## 2022-11-18 PROCEDURE — 99233 SBSQ HOSP IP/OBS HIGH 50: CPT

## 2022-11-18 PROCEDURE — 86900 BLOOD TYPING SEROLOGIC ABO: CPT

## 2022-11-18 PROCEDURE — 75894 X-RAYS TRANSCATH THERAPY: CPT

## 2022-11-18 PROCEDURE — 85576 BLOOD PLATELET AGGREGATION: CPT

## 2022-11-18 PROCEDURE — 86850 RBC ANTIBODY SCREEN: CPT

## 2022-11-18 PROCEDURE — 70551 MRI BRAIN STEM W/O DYE: CPT | Mod: 26

## 2022-11-18 PROCEDURE — 86901 BLOOD TYPING SEROLOGIC RH(D): CPT

## 2022-11-18 PROCEDURE — U0003: CPT

## 2022-11-18 PROCEDURE — C9399: CPT

## 2022-11-18 PROCEDURE — C1889: CPT

## 2022-11-18 RX ORDER — CLOPIDOGREL BISULFATE 75 MG/1
2 TABLET, FILM COATED ORAL
Qty: 60 | Refills: 0
Start: 2022-11-18 | End: 2022-12-17

## 2022-11-18 RX ORDER — ASPIRIN/CALCIUM CARB/MAGNESIUM 324 MG
1 TABLET ORAL
Qty: 30 | Refills: 0
Start: 2022-11-18

## 2022-11-18 RX ORDER — CLOPIDOGREL BISULFATE 75 MG/1
150 TABLET, FILM COATED ORAL ONCE
Refills: 0 | Status: COMPLETED | OUTPATIENT
Start: 2022-11-18 | End: 2022-11-18

## 2022-11-18 RX ORDER — CLOPIDOGREL BISULFATE 75 MG/1
150 TABLET, FILM COATED ORAL DAILY
Refills: 0 | Status: DISCONTINUED | OUTPATIENT
Start: 2022-11-19 | End: 2022-11-18

## 2022-11-18 RX ORDER — ASPIRIN/CALCIUM CARB/MAGNESIUM 324 MG
324 TABLET ORAL DAILY
Refills: 0 | Status: DISCONTINUED | OUTPATIENT
Start: 2022-11-18 | End: 2022-11-18

## 2022-11-18 RX ADMIN — CLOPIDOGREL BISULFATE 150 MILLIGRAM(S): 75 TABLET, FILM COATED ORAL at 10:20

## 2022-11-18 RX ADMIN — HEPARIN SODIUM 10 UNIT(S)/HR: 5000 INJECTION INTRAVENOUS; SUBCUTANEOUS at 12:21

## 2022-11-18 RX ADMIN — Medication 650 MILLIGRAM(S): at 05:00

## 2022-11-18 RX ADMIN — Medication 400 MILLIGRAM(S): at 11:12

## 2022-11-18 RX ADMIN — Medication 324 MILLIGRAM(S): at 11:43

## 2022-11-18 RX ADMIN — Medication 650 MILLIGRAM(S): at 06:00

## 2022-11-18 RX ADMIN — Medication 1000 MILLIGRAM(S): at 12:30

## 2022-11-18 NOTE — OCCUPATIONAL THERAPY INITIAL EVALUATION ADULT - ASSISTIVE DEVICE FOR TRANSFER: SIT/STAND, REHAB EVAL
DX:S/P Cabg    Shift Events: admitted from ed with c/o sob and overall not feeling well     Plan of Care:xray showed pleural effusion pt will be diuresed     Neuro: A/Ox 4     Respiratory: 2 liter comfort     Cardiac:sb to ns     Diet: reached out to MD awaiting orders      Gtts:Lasix      Skin:midsternal inxion intact with steri strips          rolling walker

## 2022-11-18 NOTE — DISCHARGE NOTE PROVIDER - HOSPITAL COURSE
37F hx chiropractic manipulation transfer from [place] w/ b/l vert dissection. Yesterday noticed slurred speech, double vision, left sided numbness/tingling and R side felt "different." Now reports R sided pins/needles. Patient s/p bilateral vertebral artery stenting. Patient underwent MRI / MR NOVA showing patent vertebral artery bilaterally. Patient cleared for discharged ______ 37F hx chiropractic manipulation transfer from [place] w/ b/l vert dissection. Yesterday noticed slurred speech, double vision, left sided numbness/tingling and R side felt "different." Now reports R sided pins/needles. Patient s/p bilateral vertebral artery stenting.  cangrelor and heparin infusions stopped once therapeutic on asa/plavix.  compliance with DAPT reinforced.  Patient underwent MRI / MR NOVA showing patent vertebral artery bilaterally. Patient cleared for discharged 11/18.

## 2022-11-18 NOTE — DISCHARGE NOTE NURSING/CASE MANAGEMENT/SOCIAL WORK - NSDCPEFALRISK_GEN_ALL_CORE
For information on Fall & Injury Prevention, visit: [email or web address] OR  [email or web address] OR  https://www.cdc.gov/steadi/patient.html

## 2022-11-18 NOTE — OCCUPATIONAL THERAPY INITIAL EVALUATION ADULT - PERTINENT HX OF CURRENT PROBLEM, REHAB EVAL
37F hx chiropractic manipulation xfer from [place] w/ b/l vert dissection. Yesterday noticed slurred speech, double vision, L sided numbness/tingling and R side felt "different." Now reports R sided pins/needles. Pt now POD0 s/p b/l vert dissection s/p pipeline stent.

## 2022-11-18 NOTE — DISCHARGE NOTE PROVIDER - CARE PROVIDER_API CALL
Dale Mayer)  Neurosurgery  [address], [address]  [place] [address]  Phone: [phone]  Fax: [phone]  Follow Up Time:

## 2022-11-18 NOTE — PROGRESS NOTE ADULT - SUBJECTIVE AND OBJECTIVE BOX
HPI:  37F hx chiropractic manipulation xfer from [place] w/ b/l vert dissection. Yesterday noticed slurred speech, double vision, L sided numbness/tingling and R side felt "different." Now reports R sided pins/needles. Per patient had CT, angiogram, and MRI at [place] and Dr. Mayer has discs. Exam: AOx3, keep L eye closed to avoid double vision, PERRL, EOMI, no facial, no drift, KU 5/5.    PAST MEDICAL & SURGICAL HISTORY:  none    Allergies    No Known Allergies    Intolerances            REVIEW OF SYSTEMS: [ ] Unable to Assess due to neurologic exam   [x ] All ROS addressed below are non-contributory, except:  Neuro: [ ] Headache [ ] Back pain [ ] Numbness [ ] Weakness [ ] Ataxia [ ] Dizziness [ ] Aphasia [ ] Dysarthria [ ] Visual disturbance  Resp: [ ] Shortness of breath/dyspnea, [ ] Orthopnea [ ] Cough  CV: [ ] Chest pain [ ] Palpitation [ ] Lightheadedness [ ] Syncope  Renal: [ ] Thirst [ ] Edema  GI: [ ] Nausea [ ] Emesis [ ] Abdominal pain [ ] Constipation [ ] Diarrhea  Hem: [ ] Hematemesis [ ] bright red blood per rectum  ID: [ ] Fever [ ] Chills [ ] Dysuria  ENT: [ ] Rhinorrhea          24 HOUR EVENTS:   - POD1 s/p b/l vert dissection s/p pipeline stent  - remains on cangrelor drip       T(C): 36.7 (11-18-22 @ 04:00), Max: 36.7 (11-18-22 @ 04:00)  HR: 73 (11-18-22 @ 10:20) (55 - 102)  BP: 103/62 (11-17-22 @ 12:30) (103/62 - 123/68)  RR: 16 (11-18-22 @ 10:20) (15 - 16)  SpO2: 98% (11-18-22 @ 10:20) (94% - 98%)  11-17-22 @ 07:01  -  11-18-22 @ 07:00  --------------------------------------------------------  IN: 2388 mL / OUT: 865 mL / NET: 1523 mL    11-18-22 @ 07:01  -  11-18-22 @ 10:49  --------------------------------------------------------  IN: 292 mL / OUT: 0 mL / NET: 292 mL    acetaminophen     Tablet .. 650 milliGRAM(s) Oral every 6 hours PRN  acetaminophen   IVPB .. 1000 milliGRAM(s) IV Intermittent once  aspirin  chewable 324 milliGRAM(s) Oral daily  cangrelor Infusion 2 MICROgram(s)/kG/Min IV Continuous <Continuous>  heparin  Infusion 1200 Unit(s)/Hr IV Continuous <Continuous>  HYDROmorphone  Injectable 0.25 milliGRAM(s) IV Push every 10 minutes PRN  ondansetron Injectable 4 milliGRAM(s) IV Push every 6 hours PRN  oxyCODONE    IR 5 milliGRAM(s) Oral every 4 hours PRN  oxyCODONE    IR 10 milliGRAM(s) Oral every 4 hours PRN  polyethylene glycol 3350 17 Gram(s) Oral daily  senna 2 Tablet(s) Oral at bedtime        PHYSICAL EXAM:    General: calm  CVS: RRR  Pulm: CTAB  GI: Soft, NTND  Extremities: No LE Edema  Neuro: AOx3, diplopia, slurred speech/dysarthria, follows commands, eomi, 3mm reactive b/l, no facial, LUE dysmetria, rest 5/5  Skin: no groin hematoma, +dp      LABS:  Na: 140 (11-17 @ 04:42)  K: 3.8 (11-17 @ 04:42)  Cl: 102 (11-17 @ 04:42)  CO2: 24 (11-17 @ 04:42)  BUN: 13 (11-17 @ 04:42)  Cr: 0.60 (11-17 @ 04:42)  Glu: 114(11-17 @ 04:42)    Hgb: 12.8 (11-17 @ 02:14)  Hct: 38.8 (11-17 @ 02:14)  WBC: 5.96 (11-17 @ 02:14)  Plt: 250 (11-17 @ 02:14)    INR: 1.14 11-17-22 @ 02:14  PTT: 36.5 11-17-22 @ 02:14              	  ASSESSMENT/PLAN:     cerebellar stroke, b/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 MR NOVA shows the stents in the verts and good flow   - on ASA/PLAVIX, heparin, cangelor per nsg  - PT/OT     PULM:  RA     CV:  - -140 mmHg    RENAL:  - Fluids: IVL     GI:  regular diet     ENDO:   - Goal euglycemia (-180)    HEME/ONC:  - monitor H/H    VTE prophylaxis    SCDs   on heparin drip 7        ID:  afebrile       not critical

## 2022-11-18 NOTE — OCCUPATIONAL THERAPY INITIAL EVALUATION ADULT - LIVES WITH, PROFILE
Pt lives w family in . After d/c pt will be staying with parents in  w 1 YELENA and a walk in shower./children/spouse

## 2022-11-18 NOTE — PHYSICAL THERAPY INITIAL EVALUATION ADULT - PERTINENT HX OF CURRENT PROBLEM, REHAB EVAL
37F hx chiropractic manipulation xfer from [place] w/ b/l vert dissection. Yesterday noticed slurred speech, double vision, L sided numbness/tingling and R side felt "different." Now reports R sided pins/needles. Pt now POD0 s/p b/l vert dissection s/p pipeline stent.

## 2022-11-18 NOTE — PROGRESS NOTE ADULT - SUBJECTIVE AND OBJECTIVE BOX
Patient seen and examined at bedside.    --Anticoagulation--  cangrelor Infusion 2 MICROgram(s)/kG/Min IV Continuous <Continuous>  heparin  Infusion 1200 Unit(s)/Hr IV Continuous <Continuous>    T(C): 36.5 (11-18-22 @ 00:00), Max: 36.6 (11-17-22 @ 07:25)  HR: 59 (11-18-22 @ 00:00) (59 - 102)  BP: 103/62 (11-17-22 @ 12:30) (103/62 - 123/68)  RR: 16 (11-18-22 @ 00:00) (13 - 19)  SpO2: 96% (11-18-22 @ 00:00) (94% - 100%)  Wt(kg): --    Exam:  AOx3, keep L eye closed to avoid double vision, PERRL, EOMI, no facial, no drift, KU 5/5.

## 2022-11-18 NOTE — DISCHARGE NOTE NURSING/CASE MANAGEMENT/SOCIAL WORK - PATIENT PORTAL LINK FT
You can access the FollowMyHealth Patient Portal offered by [facility] by registering at the following website: [email or web address]. By joining [facility]’s FollowMyHealth portal, you will also be able to view your health information using other applications (apps) compatible with our system.

## 2022-11-18 NOTE — PHYSICAL THERAPY INITIAL EVALUATION ADULT - ADDITIONAL COMMENTS
Pt lives in a private home with parents, there is 1 steps to enter, one flight of stairs to bedroom. Pt performed ADL/IADLs independently. Ambulates with no AD. Owns DME: none

## 2022-11-18 NOTE — DISCHARGE NOTE PROVIDER - NSDCFUADDINST_GEN_ALL_CORE_FT
Continue with ASA 325mg Daily and Plavix 150 mg Daily. Continue with ASA 325mg Daily and Plavix 150 mg Daily.    please do not engage in strenuous activity, heavy lifting, drive, or return to work or school until cleared by surgeon.     please notify physician if fevers, bleeding, swelling, pain not relieved by medication, lethargy, mental status changes, seizure activity, new bowel or bladder dysfunction, difficulty breathing, chest pain, new motor weakness, increased nausea or vomiting, inability to tolerate foods or liquids.     Shower:  please keep incision clean and dry, do not submerge wound in water for prolonged periods of time, pat dry after showering, and do not use any creams or ointments on incision.

## 2022-11-18 NOTE — DISCHARGE NOTE PROVIDER - NSDCCPCAREPLAN_GEN_ALL_CORE_FT
PRINCIPAL DISCHARGE DIAGNOSIS  Diagnosis: Vertebral artery dissection  Assessment and Plan of Treatment: s/p bilateral vertebral artery stenting

## 2022-11-18 NOTE — DISCHARGE NOTE PROVIDER - NSDCMRMEDTOKEN_GEN_ALL_CORE_FT
Aspirin Enteric Coated 325 mg oral delayed release tablet: 1 tab(s) orally once a day   clopidogrel 75 mg oral tablet: 2 tab(s) orally once a day

## 2022-11-18 NOTE — DISCHARGE NOTE PROVIDER - NSDCCPTREATMENT_GEN_ALL_CORE_FT
PRINCIPAL PROCEDURE  Procedure: Vertebral artery stenting  Findings and Treatment: Continue with  Daily and Plavix 150 mg daily.   Follow up with Dr. Mayer in his office.

## 2022-12-01 ENCOUNTER — APPOINTMENT (OUTPATIENT)
Dept: NEUROSURGERY | Facility: CLINIC | Age: 37
End: 2022-12-01

## 2022-12-01 PROCEDURE — 99212 OFFICE O/P EST SF 10 MIN: CPT | Mod: 95

## 2022-12-02 NOTE — REVIEW OF SYSTEMS
[As Noted in HPI] : as noted in HPI [Poor Coordination] : poor coordination [Difficulty Writing] : difficulty writing [Numbness] : numbness [Tingling] : tingling [Negative] : Heme/Lymph

## 2022-12-02 NOTE — ASSESSMENT
[FreeTextEntry1] : Impression: The patient is a 37-year-old female without significant past medical history presents with slurred speech, double vision, left-sided numbness and tingling and right-sided weakness and was found to have bilateral vertebral artery dissection and posterior fossa infarct\par 11/7/2022 underwent Pipeline Flex Shield embolization of bilateral vertebral artery dissections\par Patient still recovering\par Plan:\par Continue to work with PT OT Speech Therapy\par Continue aspirin 325mg daily and Plavix 150mg daily ( PRU 47 ) \par Repeat p2y12 in 1-2 weeks and based on results will consider decreasing plavix to 75mg daily or remain at plavix 150mg daily)\par MRA brain non con NOVA February 2023\par Repeat cerebral angiogram May 2023 \par Educated on signs and symptoms of a stroke and should they arise she will go to the ER

## 2022-12-02 NOTE — REASON FOR VISIT
[Home] : at home, [unfilled] , at the time of the visit. [Medical Office: ([facility])___] : at the medical office located in  [Patient] : the patient [Follow-Up: _____] : a [unfilled] follow-up visit [FreeTextEntry1] : Hospital Course: \par Discharge Date	18-Nov-2022 \par Admission Date	17-Nov-2022 00:36 \par Reason for Admission	B/l vert dissection \par Hospital Course	 \par 37F hx chiropractic manipulation transfer from [place] w/ b/l vert \par dissection. Yesterday noticed slurred speech, double vision, left sided \par numbness/tingling and R side felt "different." Now reports R sided \par pins/needles. Patient s/p bilateral vertebral artery stenting.  cangrelor and \par heparin infusions stopped once therapeutic on asa/plavix.  compliance with DAPT \par reinforced.  Patient underwent MRI / MR NOVA showing patent vertebral artery \par bilaterally. Patient cleared for discharged 11/18. \par \par Patient presents today for first hospital follow up visit. She feels her speech is a bit improved, still has lingering vision issues and sensory.  She is on aspirin 325mg daily and plavix 150mg daily.

## 2022-12-02 NOTE — RESULTS/DATA
[FreeTextEntry1] : ACC: [id number] EXAM: MR ANGIO BRAIN\par ACC: [id number] EXAM: MR BRAIN\par \par PROCEDURE DATE: 11/18/2022\par \par \par \par INTERPRETATION: CLINICAL INDICATION: Bilateral vertebral dissections post angioplasty and stenting\par \par \par Magnetic resonance imaging of the brain was carried out with transaxial SPGR, FLAIR, fast spin echo T2 weighted images, axial susceptibility weighted series, diffusion weighted series and sagittal T1 weighted series on a 1.5 Rasheeda magnet.\par \par Comparison is made with the prior conventional angiogram of 11/17/2022 and MRI 11/16/2022.\par \par \par \par The fourth, third and lateral ventricles are normal in size and position. There is no hemorrhage, mass or shift of the midline structures. A few scattered nonspecific white matter changes are identified in the supratentorial region. There are increased foci of diffusion restriction in the left cerebellum, left brachium pontis, superior cerebellar peduncle and dorsal left jonathan compared with 11/16/2022. The majority of this diffusion restriction appears to be normal evolution of the infarcts previously identified on the prior day. There is new signal hyperintensity on the T2 FLAIR images consistent with normal evolution. There are 2 punctate left cerebellar and 1 right cerebellar infarcts which are new since the prior exam.\par \par There is no acute hemorrhage. The sellar and parasellar structures are unremarkable.\par \par \par \par \par A 2 D and 3-D axial noncontrast MRA were performed on the cervical and intracranial vessels, respectively. Intravascular flow quantification was performed using gated 2D phase contrast MR, imaged perpendicular to the vessel axis. Images were post processed NOVA software and a NOVA flow study report is available.\par \par There is good flow identified in the vertebral arteries bilaterally. Signal dropout in the vertebral arteries in the neck is related to the presence of stents. Flow is as follows in milliliters per minute.\par \par RCCA 350, , RMCA 188, RACA 99, RACA2 69.\par \par LCCA 317, LICA 269, LMCA 183, LACA 101, LACA2 76.\par \par RVA neck 161, RVA distal 143, LVA neck 100, LVA distal 108, , RPCA 109, LPCA 90.\par \par IMPRESSION: Recently angioplastied with good bilateral cervical vertebral artery dissections. Flow in the vertebral arteries bilaterally using noninvasive flow MR angiography. Posterior fossa infarcts appear to be normal evolution of previous infarcts from 11/16/2022 with several punctate foci of additional restriction.\par \par --- End of Report ---\par \par \par \par \par \par AD RIVAS MD; Attending Radiologist

## 2022-12-08 ENCOUNTER — LABORATORY RESULT (OUTPATIENT)
Age: 37
End: 2022-12-08

## 2022-12-13 LAB — PA ADP PRP-ACNC: 6 PRU

## 2022-12-22 ENCOUNTER — NON-APPOINTMENT (OUTPATIENT)
Age: 37
End: 2022-12-22

## 2022-12-22 ENCOUNTER — LABORATORY RESULT (OUTPATIENT)
Age: 37
End: 2022-12-22

## 2022-12-22 LAB — PA ADP PRP-ACNC: 5 PRU

## 2023-02-03 ENCOUNTER — NON-APPOINTMENT (OUTPATIENT)
Age: 38
End: 2023-02-03

## 2023-02-03 ENCOUNTER — APPOINTMENT (OUTPATIENT)
Dept: OPHTHALMOLOGY | Facility: CLINIC | Age: 38
End: 2023-02-03
Payer: MEDICAID

## 2023-02-03 PROCEDURE — 92004 COMPRE OPH EXAM NEW PT 1/>: CPT

## 2023-02-16 ENCOUNTER — OUTPATIENT (OUTPATIENT)
Dept: OUTPATIENT SERVICES | Facility: HOSPITAL | Age: 38
LOS: 1 days | End: 2023-02-16
Payer: MEDICAID

## 2023-02-16 ENCOUNTER — APPOINTMENT (OUTPATIENT)
Dept: MRI IMAGING | Facility: HOSPITAL | Age: 38
End: 2023-02-16

## 2023-02-16 DIAGNOSIS — I77.74 DISSECTION OF VERTEBRAL ARTERY: ICD-10-CM

## 2023-02-16 PROCEDURE — 70544 MR ANGIOGRAPHY HEAD W/O DYE: CPT | Mod: 26

## 2023-02-16 PROCEDURE — 70544 MR ANGIOGRAPHY HEAD W/O DYE: CPT

## 2023-05-09 NOTE — OCCUPATIONAL THERAPY INITIAL EVALUATION ADULT - SITTING BALANCE: DYNAMIC
May 9, 2023      Urgent Care [address] [address]  Phone: [phone]  Fax: [phone]       Patient: Renaldo Ramirez   YOB: 1980  Date of Visit: 05/09/2023    To Whom It May Concern:    Dhaval Ramirez  was at [facility] on 05/09/2023. The patient may return to work on 5/10/2023 with no restrictions. If you have any questions or concerns, or if I can be of further assistance, please do not hesitate to contact me.    Sincerely,      Yanira Jean Baptiste NP      good balance

## 2023-05-30 ENCOUNTER — TRANSCRIPTION ENCOUNTER (OUTPATIENT)
Age: 38
End: 2023-05-30

## 2023-05-30 ENCOUNTER — OUTPATIENT (OUTPATIENT)
Dept: OUTPATIENT SERVICES | Facility: HOSPITAL | Age: 38
LOS: 1 days | End: 2023-05-30
Payer: MEDICAID

## 2023-05-30 ENCOUNTER — RESULT REVIEW (OUTPATIENT)
Age: 38
End: 2023-05-30

## 2023-05-30 ENCOUNTER — APPOINTMENT (OUTPATIENT)
Dept: NEUROSURGERY | Facility: HOSPITAL | Age: 38
End: 2023-05-30

## 2023-05-30 VITALS
HEART RATE: 59 BPM | RESPIRATION RATE: 17 BRPM | OXYGEN SATURATION: 99 % | SYSTOLIC BLOOD PRESSURE: 115 MMHG | DIASTOLIC BLOOD PRESSURE: 90 MMHG

## 2023-05-30 VITALS
DIASTOLIC BLOOD PRESSURE: 81 MMHG | HEIGHT: 69 IN | SYSTOLIC BLOOD PRESSURE: 120 MMHG | RESPIRATION RATE: 18 BRPM | WEIGHT: 184.97 LBS | HEART RATE: 53 BPM | TEMPERATURE: 98 F | OXYGEN SATURATION: 100 %

## 2023-05-30 DIAGNOSIS — Z00.00 ENCOUNTER FOR GENERAL ADULT MEDICAL EXAMINATION WITHOUT ABNORMAL FINDINGS: ICD-10-CM

## 2023-05-30 DIAGNOSIS — Z86.79 PERSONAL HISTORY OF OTHER DISEASES OF THE CIRCULATORY SYSTEM: ICD-10-CM

## 2023-05-30 LAB
PA ADP PRP-ACNC: 54 PRU — LOW (ref 194–417)
PLATELET RESPONSE ASPIRIN RESULT: 556 ARU — SIGNIFICANT CHANGE UP (ref 350–700)

## 2023-05-30 PROCEDURE — 86900 BLOOD TYPING SEROLOGIC ABO: CPT

## 2023-05-30 PROCEDURE — 36227 PLACE CATH XTRNL CAROTID: CPT | Mod: RT

## 2023-05-30 PROCEDURE — 36224 PLACE CATH CAROTD ART: CPT

## 2023-05-30 PROCEDURE — 85576 BLOOD PLATELET AGGREGATION: CPT

## 2023-05-30 PROCEDURE — 86850 RBC ANTIBODY SCREEN: CPT

## 2023-05-30 PROCEDURE — 36226 PLACE CATH VERTEBRAL ART: CPT | Mod: 50

## 2023-05-30 PROCEDURE — C1760: CPT

## 2023-05-30 PROCEDURE — 36226 PLACE CATH VERTEBRAL ART: CPT

## 2023-05-30 PROCEDURE — 36227 PLACE CATH XTRNL CAROTID: CPT

## 2023-05-30 PROCEDURE — 36224 PLACE CATH CAROTD ART: CPT | Mod: 50

## 2023-05-30 PROCEDURE — 86901 BLOOD TYPING SEROLOGIC RH(D): CPT

## 2023-05-30 PROCEDURE — C1769: CPT

## 2023-05-30 PROCEDURE — C1887: CPT

## 2023-05-30 PROCEDURE — C1894: CPT

## 2023-05-30 NOTE — ASU PATIENT PROFILE, ADULT - FALL HARM RISK - UNIVERSAL INTERVENTIONS
Bed in lowest position, wheels locked, appropriate side rails in place/Call bell, personal items and telephone in reach/Instruct patient to call for assistance before getting out of bed or chair/Non-slip footwear when patient is out of bed/[place] to call system/Physically safe environment - no spills, clutter or unnecessary equipment/Purposeful Proactive Rounding/Room/bathroom lighting operational, light cord in reach

## 2023-05-30 NOTE — H&P ADULT - HISTORY OF PRESENT ILLNESS
This is a 37-year-old female without significant past medical history presents with  bilateral vertebral artery dissection, s/p  Pipeline Flex Shield embolization of bilateral vertebral artery dissections in 11/2022.

## 2023-05-30 NOTE — ASU DISCHARGE PLAN (ADULT/PEDIATRIC) - NURSING INSTRUCTIONS
Please feel free to contact us at [phone] if any problems arise. After 6PM, Monday through Friday, on weekends and on holidays, please call [phone] and ask for the radiology resident on call to be paged.

## 2023-05-30 NOTE — ASU DISCHARGE PLAN (ADULT/PEDIATRIC) - NS MD DC FALL RISK RISK
For information on Fall & Injury Prevention, visit: [email or web address] OR  [email or web address] OR  https://www.cdc.gov/steadi/patient.html

## 2023-05-30 NOTE — ASU DISCHARGE PLAN (ADULT/PEDIATRIC) - CARE PROVIDER_API CALL
Dale Mayer  Neurosurgery  [address], [address]  [place] [address]  Phone: [phone]  Fax: [phone]  Follow Up Time:

## 2023-05-30 NOTE — H&P ADULT - NSVTERISKREFERASSESS_GEN_ALL_CORE
Patient daughter Deepti is calling asking for [facility] to call her back a couple times in a row so her phone will ring at [phone] Refer to the Assessment tab to view/cancel completed assessment.

## 2023-05-30 NOTE — CHART NOTE - NSCHARTNOTEFT_GEN_A_CORE
Interventional Neuro- Radiology   Procedure Note      Procedure: Selective Cerebral Angiography   Pre- Procedure Diagnosis: b/l vertebral artery dissections s/p stent/ angioplasty   Post- Procedure Diagnosis:    : Dr. Moreno MD  Fellow: MD Dr. Ney Torres MD   NP: Shashank     RN: Juvencio Benavides: Pascual      Anesthesia: Dr. Solorio (MAC)     I/Os:  Fluids:  Buckley: DTV   Contrast:  Estimated Blood Loss: <10cc    Preliminary Report:  Under MAC, using a 5Fr short sheath to the right femoral artery examination of left vertebral artery/ right vertebral artery via selective cerebral angiography demonstrates ________. ( Official note to follow).    Patient tolerated procedure well, vital signs stable, hemodynamically stable, no change in neurological status compared to baseline. Results discussed with neurosurgery/ patient and their family. Groin sheath d/c'ed, manual compression held to hemostasis, no active bleeding, no hematoma, Vascade applied, quick clot and safeguard balloon dressing applied at _____h. Patient transferred to IR recovery for further care/ monitoring. Interventional Neuro- Radiology   Procedure Note      Procedure: Selective Cerebral Angiography   Pre- Procedure Diagnosis: b/l vertebral artery dissections s/p stent/ angioplasty   Post- Procedure Diagnosis: no in stent stenosis     : Dr. Moreno MD  Fellow: Dr. Janie MD    NP: Shashank     RN: Juvencio Benavides: Pascual      Anesthesia: Dr. Solorio (MAC)     I/Os:  Fluids: 50cc   Buckley: DTV   Contrast: 71cc   Estimated Blood Loss: <10cc    Preliminary Report:  Under MAC, using a 5Fr short sheath to the right femoral artery examination of left vertebral artery/ right vertebral artery via selective cerebral angiography demonstrates no in stent stenosis. ( Official note to follow).    Patient tolerated procedure well, vital signs stable, hemodynamically stable, no change in neurological status compared to baseline. Results discussed with neurosurgery/ patient and their family. Groin sheath d/c'ed, manual compression held to hemostasis, no active bleeding, no hematoma, Vascade applied, quick clot and safeguard balloon dressing applied at 1040h. Patient transferred to IR recovery for further care/ monitoring.    Bel Raphael PA-C  [phone]

## 2023-05-30 NOTE — H&P ADULT - ASSESSMENT
This is a 37-year-old female without significant past medical history presents with  bilateral vertebral artery dissection, s/p  Pipeline Flex Shield embolization of bilateral vertebral artery dissections in 11/2022. Patient presents now to neuro IR for selective cerebral angiogram, possible embolization. procedure/ risks/ benefits explained to pt, verbalizes understanding, consent obtained.

## 2023-05-30 NOTE — ASU DISCHARGE PLAN (ADULT/PEDIATRIC) - ASU DC SPECIAL INSTRUCTIONSFT
Hemostasis: Aluminum Chloride Bill 86162 For Specimen Handling/Conveyance To Laboratory?: no Additional Anesthesia Volume In Cc (Will Not Render If 0): 0 Cryotherapy Text: The wound bed was treated with cryotherapy after the biopsy was performed. consent was obtained and risks were reviewed including but not limited to scarring, infection, bleeding, scabbing, incomplete removal, nerve damage and allergy to anesthesia. Detail Level: Detailed Curettage Text: The wound bed was treated with curettage after the biopsy was performed. Type Of Destruction Used: Curettage Post-Care Instructions: I reviewed with the patient in detail post-care instructions. Patient is to keep the biopsy site dry overnight, then wash with soap and water and apply ointment daily until healed. Biopsy Type: H and E Electrodesiccation Text: The wound bed was treated with electrodesiccation after the biopsy was performed. Was A Bandage Applied: Yes Silver Nitrate Text: The wound bed was treated with silver nitrate after the biopsy was performed. Dressing: bandage Biopsy Method: double edge Personna blade STOP PLAVIX  CONTINUE ASPIRIN   mra 6  MONTHS Electrodesiccation And Curettage Text: The wound bed was treated with electrodesiccation and curettage after the biopsy was performed. Notification Instructions: Patient will be notified of biopsy results. However, patient instructed to call the office if not contacted within 2 weeks. Size Of Lesion In Cm: 1 Path Notes (To The Dermatopathologist): [facility] Billing Type: Third-Party Bill Lab Facility: [id number] Lab: -[id number] Body Location Override (Optional - Billing Will Still Be Based On Selected Body Map Location If Applicable): midline anterior lower neck Wound Care: Vaseline Anesthesia Volume In Cc: 0.2 Anesthesia Type: 0.5% lidocaine with 1:200,000 epinephrine and a 1:10 solution of 8.4% sodium bicarbonate

## 2023-07-27 PROBLEM — I77.74 DISSECTION OF VERTEBRAL ARTERY: Chronic | Status: ACTIVE | Noted: 2023-05-30

## 2023-12-01 ENCOUNTER — OUTPATIENT (OUTPATIENT)
Dept: OUTPATIENT SERVICES | Facility: HOSPITAL | Age: 38
LOS: 1 days | End: 2023-12-01
Payer: COMMERCIAL

## 2023-12-01 ENCOUNTER — APPOINTMENT (OUTPATIENT)
Dept: MRI IMAGING | Facility: HOSPITAL | Age: 38
End: 2023-12-01

## 2023-12-01 DIAGNOSIS — I77.74 DISSECTION OF VERTEBRAL ARTERY: ICD-10-CM

## 2023-12-01 PROCEDURE — 70544 MR ANGIOGRAPHY HEAD W/O DYE: CPT | Mod: 26

## 2023-12-01 PROCEDURE — 70544 MR ANGIOGRAPHY HEAD W/O DYE: CPT

## 2024-02-27 ENCOUNTER — APPOINTMENT (OUTPATIENT)
Dept: PLASTIC SURGERY | Facility: CLINIC | Age: 39
End: 2024-02-27
Payer: COMMERCIAL

## 2024-02-27 VITALS
DIASTOLIC BLOOD PRESSURE: 78 MMHG | OXYGEN SATURATION: 99 % | HEIGHT: 69 IN | SYSTOLIC BLOOD PRESSURE: 124 MMHG | RESPIRATION RATE: 16 BRPM | BODY MASS INDEX: 25.62 KG/M2 | HEART RATE: 90 BPM | WEIGHT: 173 LBS

## 2024-02-27 DIAGNOSIS — Z86.018 PERSONAL HISTORY OF OTHER BENIGN NEOPLASM: ICD-10-CM

## 2024-02-27 DIAGNOSIS — I63.9 CEREBRAL INFARCTION, UNSPECIFIED: ICD-10-CM

## 2024-02-27 DIAGNOSIS — M25.512 PAIN IN LEFT SHOULDER: ICD-10-CM

## 2024-02-27 DIAGNOSIS — M54.2 CERVICALGIA: ICD-10-CM

## 2024-02-27 DIAGNOSIS — M54.6 PAIN IN THORACIC SPINE: ICD-10-CM

## 2024-02-27 DIAGNOSIS — Z80.3 FAMILY HISTORY OF MALIGNANT NEOPLASM OF BREAST: ICD-10-CM

## 2024-02-27 DIAGNOSIS — I77.74 DISSECTION OF VERTEBRAL ARTERY: ICD-10-CM

## 2024-02-27 DIAGNOSIS — M25.511 PAIN IN RIGHT SHOULDER: ICD-10-CM

## 2024-02-27 DIAGNOSIS — N62 HYPERTROPHY OF BREAST: ICD-10-CM

## 2024-02-27 PROCEDURE — 99205 OFFICE O/P NEW HI 60 MIN: CPT

## 2024-02-27 RX ORDER — CLOPIDOGREL BISULFATE 75 MG/1
75 TABLET, FILM COATED ORAL DAILY
Qty: 30 | Refills: 8 | Status: DISCONTINUED | COMMUNITY
Start: 2022-12-09 | End: 2024-02-27

## 2024-03-01 PROBLEM — M54.6 MIDLINE THORACIC BACK PAIN, UNSPECIFIED CHRONICITY: Status: ACTIVE | Noted: 2024-03-01

## 2024-03-01 PROBLEM — M54.2 NECK PAIN: Status: ACTIVE | Noted: 2024-03-01

## 2024-03-01 PROBLEM — M25.511 PAIN IN JOINT OF RIGHT SHOULDER: Status: ACTIVE | Noted: 2024-03-01

## 2024-03-01 PROBLEM — M25.512 PAIN IN JOINT OF LEFT SHOULDER: Status: ACTIVE | Noted: 2024-03-01

## 2024-03-01 PROBLEM — I63.9 ISCHEMIC STROKE: Status: RESOLVED | Noted: 2022-12-02 | Resolved: 2024-03-01

## 2024-03-01 PROBLEM — N62 MACROMASTIA: Status: ACTIVE | Noted: 2024-03-01

## 2024-03-01 PROBLEM — Z86.018 HISTORY OF FIBROADENOMA OF BREAST: Status: RESOLVED | Noted: 2024-02-27 | Resolved: 2024-03-01

## 2024-03-01 RX ORDER — ASPIRIN 325 MG/1
325 TABLET, FILM COATED ORAL
Refills: 0 | Status: ACTIVE | COMMUNITY

## 2024-03-08 PROBLEM — I77.74 VERTEBRAL ARTERY DISSECTION: Status: RESOLVED | Noted: 2022-12-02 | Resolved: 2024-03-01

## 2024-03-08 PROBLEM — Z80.3 FAMILY HISTORY OF MALIGNANT NEOPLASM OF BREAST: Status: ACTIVE | Noted: 2024-02-27

## 2024-03-08 NOTE — ASSESSMENT
[FreeTextEntry1] : Bilateral macromastia with ptosis and associated neck, shoulder and back pain. Bilateral breast reduction is planned. We reviewed the R/B/A including, but not limited to, the risks of decreased sensation or loss of sensation of the NAC, partial or complete NAC necrosis, wound healing problems requiring wound care, especially at the T-junction points of the incisions. We also reviewed the fact that I cannot promise to achieve any given bra cup size postoperatively as this depends on many factors including the style and  of the bra. We also discussed the lateral bra rolls, and that these will not be resolved with this procedure. I went over the different scar shapes and their positions on the breasts. Scar thickness and texture are also variable, and tend to be thicker with darker skin tones.  We also reviewed risks of surgery in general (bleeding or hematoma requiring a return to the operating room, wound breakdown at the T junctions and infection).  Given this patient's physical exam and complaints, I estimate that the removal of 300-350 gms of tissue per side will improve her symptoms significantly. Reduction mammaplasty is a medically necessary procedure for this patient, as it is being performed for the relief of symptomatic breast hypertrophy.  The non-surgical treatments the patient has used to try to relieve her symptoms include: -Physical therapy and chiropractic treatment. -Heat packs and heating pads. -Pt cannot take NSAIDs due to chronic ASA treatment. -Supportive bras.  We reviewed pain management. I recommend alternating Tylenol and ibuprofen every 4 hours (8 hours between doses of each) and then adding oxycodone for breakthrough pain as needed. She would only be able to take the ibuprofen during the time when she is off of the ASA in the perioperative period. Pt reported her understanding.   She will need medical clearance, including neurology/neurosurgery clearance and recommendations regarding ASA use and whether it can be held in the perioperative period or not.

## 2024-03-08 NOTE — PHYSICAL EXAM
[NI] : Normal [Bra Size: _______] : Bra Size: [unfilled] [de-identified] : NL respiratory effort noted  [de-identified] : Bilateral macromastia with grade 3+ ptosis. No palpable masses. No palpable PILI Inframammary fold skin is creased and pink. Appears friable. [de-identified] : Anxious. Speech occ rapid or pressured.

## 2024-03-08 NOTE — REASON FOR VISIT
[FreeTextEntry1] : Maren presents today for a breast reduction consultation. Maren's current bra size is a 36H

## 2024-03-08 NOTE — HISTORY OF PRESENT ILLNESS
[FreeTextEntry1] : This is a 39 yo woman who presents with many years of neck, shoulder and back pain due to the size and weight of her breasts. She reports that she was evaluated in her late teens for breast reduction and it was approved, but she was to nervous to go through with it at that time. She currently wears a 36 H bra, although she has tried many different bras without finding anything comfortable. She wears a full coverage nursing bra top now to try to spread out the weight on her shoulders and neck. She reports she was getting severe headaches when she tried wearing racer back bras and realized it was coming from the increased pull on her neck. She uses anti-inflammatory medications such as ibuprofen and has been regularly seen by a chiropractor without any lasting relief.

## 2024-03-08 NOTE — REVIEW OF SYSTEMS
[As Noted in HPI] : as noted in HPI [Negative] : Respiratory [de-identified] : Rashes under the breasts, especially in the summer. [de-identified] : Anxious about, yet very eager to have breast surgery.

## 2024-04-30 NOTE — PROGRESS NOTE ADULT - ASSESSMENT
[facility] [place] [facility]  [address] [place] [address]  W: [phone] F: [phone]    Reason for Visit:   Kimmy Culp is a 69 y.o. female who is seen in consultation at the request of Dr. Barnes for evaluation of new diagnosis of colon cancer. Seen in follow-up for chemotherapy.      Hematology / Oncology Treatment Information:     Hematological/Oncological Diagnosis: Stage IV, [id number] Colon Adenocarcinoma    Date of Diagnosis: 6/23/23    Oncology/Hematology Treatment Course:   Treatment course:   1) 9/12/23: palliative immunotherapy with pembrolizumab - ongoing    Pathology and Molecular Testing:       FINAL PATHOLOGIC DIAGNOSIS     1. Lymph nodes, perigastric, excision:        One of five lymph nodes positive for metastatic carcinoma (1/5)     2. Lymph nodes, root of mesentery, excision:        One of two lymph nodes positive for metastatic carcinoma (1/2)   Extranodal extension is present     3. Ascending and transverse colon, appendix, terminal ileum and omentum,   extended right hemicolectomy:        Colonic adenocarcinoma, transverse colon, moderately differentiated   Appendix with fibrofatty obliteration of lumen and focal endometriosis   One of 33 regional lymph nodes positive for metastatic carcinoma (1/33)     COLON AND RECTUM: Resection, Including Transanal Disk Excision of      Rectal Neoplasms    SPECIMEN       Procedure: Extended right hemicolectomy    TUMOR       Tumor Site: Transverse colon       Histologic Type: Adenocarcinoma       Histologic Grade: G2, moderately differentiated       Tumor Size: 9.0 cm       Tumor Extent: Invades visceral peritoneum (3D)       Macroscopic Tumor Perforation: Not identified       Lymphovascular Invasion: Present       Perineural Invasion: Not identified       Treatment Effect: No known presurgical therapy    MARGINS       Margin Status for Invasive Carcinoma: All margins  ASSESSMENT/PLAN:     cerebellar stroke, b/l vert dissection s/p pipeline stent    NEURO:  - Neurochecks q1h  - MRI brain WWO,. MR NOVA  - on ASA/PLAVIX, heparin, cangelor per nsg  - ARU/PRU now  - Pain control  - stroke consult  - Activity: bed rest for now    PULM:  - Incentive spirometry  - mobilize as tolerated  - Aspiration Precautions    CV:  - -140 mmHg  - tte, ekg, a1c, lipid panel    RENAL:  - Fluids: IVF until good PO intake  - trend renal function  - d/c beth in AM    GI:  - Diet: Dysphagia screen and then advance diet as tolerated  - GI prophylaxis: na  - Bowel regimen standing    ENDO:   - Goal euglycemia (-180)    HEME/ONC:  - monitor H/H    VTE prophylaxis   - SCDs   - hold chemoprophylaxis due to: heparin gtt aptt 60-90  - aptt q6h      ID:  - Madhavi-op antibiotics        30 critical care time at risk for cerebellar hemorrhage, stroke  ASSESSMENT/PLAN:     cerebellar stroke, b/l vert dissection s/p pipeline stent    NEURO:  - Neurochecks q1h  - MRI brain WWO. MR NOVA  - on ASA/PLAVIX, heparin, cangelor per nsg  - ARU/PRU subtherapeutic, was loaded with another dose of plavix, will repeat PRU in the AM  - Pain control  - stroke consult  - Activity: bed rest for now    PULM:  - Incentive spirometry  - mobilize as tolerated  - Aspiration Precautions    CV:  - -140 mmHg  - tte, ekg, a1c, lipid panel    RENAL:  - Fluids: IVl  - trend renal function  - d/c beth in AM    GI:  - Diet: Dysphagia screen and then advance diet as tolerated  - GI prophylaxis: na  - Bowel regimen standing    ENDO:   - Goal euglycemia (-180)    HEME/ONC:  - monitor H/H    VTE prophylaxis   - SCDs   - hold chemoprophylaxis due to: heparin gtt aptt 60-90  - aptt q6h    ID:  - afebrile, no leukocytosis         30 critical care time at risk for cerebellar hemorrhage, stroke

## 2024-05-21 ENCOUNTER — OUTPATIENT (OUTPATIENT)
Dept: OUTPATIENT SERVICES | Facility: HOSPITAL | Age: 39
LOS: 1 days | End: 2024-05-21
Payer: COMMERCIAL

## 2024-05-21 VITALS
SYSTOLIC BLOOD PRESSURE: 108 MMHG | OXYGEN SATURATION: 98 % | TEMPERATURE: 98 F | RESPIRATION RATE: 18 BRPM | DIASTOLIC BLOOD PRESSURE: 74 MMHG | WEIGHT: 164.02 LBS | HEIGHT: 69 IN | HEART RATE: 85 BPM

## 2024-05-21 DIAGNOSIS — Z01.818 ENCOUNTER FOR OTHER PREPROCEDURAL EXAMINATION: ICD-10-CM

## 2024-05-21 DIAGNOSIS — Z98.890 OTHER SPECIFIED POSTPROCEDURAL STATES: Chronic | ICD-10-CM

## 2024-05-21 DIAGNOSIS — R93.0 ABNORMAL FINDINGS ON DIAGNOSTIC IMAGING OF SKULL AND HEAD, NOT ELSEWHERE CLASSIFIED: Chronic | ICD-10-CM

## 2024-05-21 DIAGNOSIS — I77.74 DISSECTION OF VERTEBRAL ARTERY: ICD-10-CM

## 2024-05-21 DIAGNOSIS — Z98.891 HISTORY OF UTERINE SCAR FROM PREVIOUS SURGERY: Chronic | ICD-10-CM

## 2024-05-21 DIAGNOSIS — O32.9XX0 MATERNAL CARE FOR MALPRESENTATION OF FETUS, UNSPECIFIED, NOT APPLICABLE OR UNSPECIFIED: Chronic | ICD-10-CM

## 2024-05-21 LAB
ANION GAP SERPL CALC-SCNC: 12 MMOL/L — SIGNIFICANT CHANGE UP (ref 5–17)
BLD GP AB SCN SERPL QL: NEGATIVE — SIGNIFICANT CHANGE UP
BUN SERPL-MCNC: 15 MG/DL — SIGNIFICANT CHANGE UP (ref 7–23)
CALCIUM SERPL-MCNC: 9.6 MG/DL — SIGNIFICANT CHANGE UP (ref 8.4–10.5)
CHLORIDE SERPL-SCNC: 105 MMOL/L — SIGNIFICANT CHANGE UP (ref 96–108)
CO2 SERPL-SCNC: 23 MMOL/L — SIGNIFICANT CHANGE UP (ref 22–31)
CREAT SERPL-MCNC: 0.71 MG/DL — SIGNIFICANT CHANGE UP (ref 0.5–1.3)
EGFR: 112 ML/MIN/1.73M2 — SIGNIFICANT CHANGE UP
GLUCOSE SERPL-MCNC: 88 MG/DL — SIGNIFICANT CHANGE UP (ref 70–99)
HCT VFR BLD CALC: 38.4 % — SIGNIFICANT CHANGE UP (ref 34.5–45)
HGB BLD-MCNC: 12.9 G/DL — SIGNIFICANT CHANGE UP (ref 11.5–15.5)
MCHC RBC-ENTMCNC: 30.3 PG — SIGNIFICANT CHANGE UP (ref 27–34)
MCHC RBC-ENTMCNC: 33.6 GM/DL — SIGNIFICANT CHANGE UP (ref 32–36)
MCV RBC AUTO: 90.1 FL — SIGNIFICANT CHANGE UP (ref 80–100)
NRBC # BLD: 0 /100 WBCS — SIGNIFICANT CHANGE UP (ref 0–0)
PLATELET # BLD AUTO: 235 K/UL — SIGNIFICANT CHANGE UP (ref 150–400)
PLATELET RESPONSE ASPIRIN RESULT: 528 ARU — SIGNIFICANT CHANGE UP
POTASSIUM SERPL-MCNC: 3.8 MMOL/L — SIGNIFICANT CHANGE UP (ref 3.5–5.3)
POTASSIUM SERPL-SCNC: 3.8 MMOL/L — SIGNIFICANT CHANGE UP (ref 3.5–5.3)
RBC # BLD: 4.26 M/UL — SIGNIFICANT CHANGE UP (ref 3.8–5.2)
RBC # FLD: 12.6 % — SIGNIFICANT CHANGE UP (ref 10.3–14.5)
RH IG SCN BLD-IMP: POSITIVE — SIGNIFICANT CHANGE UP
SODIUM SERPL-SCNC: 140 MMOL/L — SIGNIFICANT CHANGE UP (ref 135–145)
WBC # BLD: 5.29 K/UL — SIGNIFICANT CHANGE UP (ref 3.8–10.5)
WBC # FLD AUTO: 5.29 K/UL — SIGNIFICANT CHANGE UP (ref 3.8–10.5)

## 2024-05-21 PROCEDURE — 80048 BASIC METABOLIC PNL TOTAL CA: CPT

## 2024-05-21 PROCEDURE — G0463: CPT

## 2024-05-21 PROCEDURE — 85576 BLOOD PLATELET AGGREGATION: CPT

## 2024-05-21 PROCEDURE — 85027 COMPLETE CBC AUTOMATED: CPT

## 2024-05-21 PROCEDURE — 86850 RBC ANTIBODY SCREEN: CPT

## 2024-05-21 PROCEDURE — 86901 BLOOD TYPING SEROLOGIC RH(D): CPT

## 2024-05-21 PROCEDURE — 86900 BLOOD TYPING SEROLOGIC ABO: CPT

## 2024-05-21 NOTE — H&P PST ADULT - NSICDXFAMILYHX_GEN_ALL_CORE_FT
FAMILY HISTORY:  FH: hypothyroidism    Aunt  Still living? Unknown  FH: breast cancer, Age at diagnosis: Age Unknown    Uncle  Still living? Unknown  FH: prostate cancer, Age at diagnosis: Age Unknown

## 2024-05-21 NOTE — H&P PST ADULT - HISTORY OF PRESENT ILLNESS
This is a 37-year-old female without significant past medical history presents with  bilateral vertebral artery dissection, s/p  Pipeline Flex Shield embolization of bilateral vertebral artery dissections in 2022.    antibody infusion 2021 - omicron variant    3/21/2020 - homebirth  2022 -  after eversion   40 yo female presents to PST prior to scheduled follow up cerebral angiogram on 5/28/24 with Dr. Dale Mayer. Pmhx includes  bilateral vertebral artery dissection s/p  Pipeline Flex Shield embolization of bilateral vertebral artery dissections in 11/2022 with Dr. Mayer. Patient takes aspirin 325 mg only. Endorses mild imbalance. Denies headaches, vision changes, paresthesias, chest pain, palpitations, sob/rico, dizziness, syncope, fever or chills.

## 2024-05-21 NOTE — H&P PST ADULT - NSICDXPASTSURGICALHX_GEN_ALL_CORE_FT
PAST SURGICAL HISTORY:  Abnormal angiogram of head     H/O:       PAST SURGICAL HISTORY:  Abnormal angiogram of head     Failed external cephalic version     H/O:      S/P excision of fibroadenoma of breast

## 2024-05-21 NOTE — H&P PST ADULT - PROBLEM SELECTOR PLAN 1
Follow up cerebral angiogram on 5/28/24 with Dr. Dale Mayer.  Pre-op instructions given. Questions answered.

## 2024-05-21 NOTE — H&P PST ADULT - ASSESSMENT
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

## 2024-05-21 NOTE — H&P PST ADULT - NSANTHOSAYNRD_GEN_A_CORE
No. MARYSE screening performed.  STOP BANG Legend: 0-2 = LOW Risk; 3-4 = INTERMEDIATE Risk; 5-8 = HIGH Risk

## 2024-05-21 NOTE — H&P PST ADULT - NSICDXPASTMEDICALHX_GEN_ALL_CORE_FT
PAST MEDICAL HISTORY:  2019 novel coronavirus disease (COVID-19)     Dissection, vertebral artery      PAST MEDICAL HISTORY:  2019 novel coronavirus disease (COVID-19)     Breast fibroadenoma     Dissection, vertebral artery

## 2024-05-24 ENCOUNTER — APPOINTMENT (OUTPATIENT)
Dept: PLASTIC SURGERY | Facility: HOSPITAL | Age: 39
End: 2024-05-24

## 2024-05-28 ENCOUNTER — OUTPATIENT (OUTPATIENT)
Dept: OUTPATIENT SERVICES | Facility: HOSPITAL | Age: 39
LOS: 1 days | End: 2024-05-28
Payer: COMMERCIAL

## 2024-05-28 ENCOUNTER — TRANSCRIPTION ENCOUNTER (OUTPATIENT)
Age: 39
End: 2024-05-28

## 2024-05-28 ENCOUNTER — APPOINTMENT (OUTPATIENT)
Dept: NEUROSURGERY | Facility: HOSPITAL | Age: 39
End: 2024-05-28
Payer: COMMERCIAL

## 2024-05-28 VITALS — OXYGEN SATURATION: 98 % | WEIGHT: 164.02 LBS | HEIGHT: 69 IN

## 2024-05-28 VITALS
RESPIRATION RATE: 18 BRPM | DIASTOLIC BLOOD PRESSURE: 67 MMHG | OXYGEN SATURATION: 100 % | HEART RATE: 69 BPM | SYSTOLIC BLOOD PRESSURE: 129 MMHG

## 2024-05-28 DIAGNOSIS — I77.74 DISSECTION OF VERTEBRAL ARTERY: ICD-10-CM

## 2024-05-28 DIAGNOSIS — Z98.891 HISTORY OF UTERINE SCAR FROM PREVIOUS SURGERY: Chronic | ICD-10-CM

## 2024-05-28 DIAGNOSIS — Z98.890 OTHER SPECIFIED POSTPROCEDURAL STATES: Chronic | ICD-10-CM

## 2024-05-28 DIAGNOSIS — O32.9XX0 MATERNAL CARE FOR MALPRESENTATION OF FETUS, UNSPECIFIED, NOT APPLICABLE OR UNSPECIFIED: Chronic | ICD-10-CM

## 2024-05-28 DIAGNOSIS — R93.0 ABNORMAL FINDINGS ON DIAGNOSTIC IMAGING OF SKULL AND HEAD, NOT ELSEWHERE CLASSIFIED: Chronic | ICD-10-CM

## 2024-05-28 PROCEDURE — 36224 PLACE CATH CAROTD ART: CPT

## 2024-05-28 PROCEDURE — C1887: CPT

## 2024-05-28 PROCEDURE — 36226 PLACE CATH VERTEBRAL ART: CPT

## 2024-05-28 PROCEDURE — 36226 PLACE CATH VERTEBRAL ART: CPT | Mod: 50

## 2024-05-28 PROCEDURE — 36224 PLACE CATH CAROTD ART: CPT | Mod: 50

## 2024-05-28 PROCEDURE — C1894: CPT

## 2024-05-28 PROCEDURE — C1769: CPT

## 2024-05-28 RX ORDER — HYDROMORPHONE HYDROCHLORIDE 2 MG/ML
0.5 INJECTION INTRAMUSCULAR; INTRAVENOUS; SUBCUTANEOUS
Refills: 0 | Status: DISCONTINUED | OUTPATIENT
Start: 2024-05-28 | End: 2024-05-28

## 2024-05-28 RX ORDER — SODIUM CHLORIDE 9 MG/ML
1000 INJECTION, SOLUTION INTRAVENOUS
Refills: 0 | Status: DISCONTINUED | OUTPATIENT
Start: 2024-05-28 | End: 2024-06-11

## 2024-05-28 NOTE — CHART NOTE - NSCHARTNOTEFT_GEN_A_CORE
Interventional Neuro- Radiology   Procedure Note      Procedure: Selective Cerebral Angiography   Pre- Procedure Diagnosis: vertebral artery dissections s/p stent   Post- Procedure Diagnosis:    : Dr. Moreno MD  Fellow: MD Dr. Padilla Edwards MD Dr. White, MD   Physician Assistant: Bel Raphael PA-C    RN:  Tech:    Anesthesia: (MAC)      I/Os:  Fluids:  Buckley: DTV   Contrast:  Estimated Blood Loss: <10cc    Preliminary Report:  Under MAC, using a ___Fr short/long sheath to the right femoral artery examination of left vertebral artery/ left internal carotid artery/ left external carotid artery/ right vertebral artery/ right internal carotid artery/ right external carotid artery via selective cerebral angiography demonstrates ________. ( Official note to follow).    Patient tolerated procedure well, vital signs stable, hemodynamically stable, no change in neurological status compared to baseline. Results discussed with neurosurgery/ patient and their family. Patient transferred to IR recovery for further care/ monitoring.     Vascular access site:  Ultrasound guidance- yes  Fluoroscopy before procedure- yes   Fluoroscopy to confirm correct needle position after puncture and before advancing sheath- yes  Femoral artery angiography before sheath removal- yes  Closure device used- Vascade/ Celt  Closure device appropriately deployed- yes  Manual pressure- held for 10minutes until hemostasis, no active bleeding or hematoma  Safeguard dressing applied- yes, applied at ___h.

## 2024-05-28 NOTE — ASU PATIENT PROFILE, ADULT - NSICDXPASTMEDICALHX_GEN_ALL_CORE_FT
PAST MEDICAL HISTORY:  2019 novel coronavirus disease (COVID-19)     Breast fibroadenoma     Dissection, vertebral artery

## 2024-05-28 NOTE — ASU PATIENT PROFILE, ADULT - NSICDXPASTSURGICALHX_GEN_ALL_CORE_FT
PAST SURGICAL HISTORY:  Abnormal angiogram of head     Failed external cephalic version     H/O:      S/P excision of fibroadenoma of breast

## 2024-05-28 NOTE — ASU DISCHARGE PLAN (ADULT/PEDIATRIC) - NS MD DC FALL RISK RISK
For information on Fall & Injury Prevention, visit: [email or web address] OR  [email or web address] OR  https://www.cdc.gov/steadi/patient.html

## 2024-05-28 NOTE — ASU PATIENT PROFILE, ADULT - FALL HARM RISK - UNIVERSAL INTERVENTIONS
Bed in lowest position, wheels locked, appropriate side rails in place/Call bell, personal items and telephone in reach/Instruct patient to call for assistance before getting out of bed or chair/Non-slip footwear when patient is out of bed/[place] to call system/Physically safe environment - no spills, clutter or unnecessary equipment/Purposeful Proactive Rounding/Room/bathroom lighting operational, light cord in reach

## 2024-05-28 NOTE — CHART NOTE - NSCHARTNOTEFT_GEN_A_CORE
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 Mayer. Patient takes aspirin 325 mg only. Endorses mild imbalance. Denies headaches, vision changes, paresthesias, chest pain, palpitations, sob/rico, dizziness, syncope, fever or chills.     Neuro Exam: Awake and alert, oriented x3, fluent, normal naming and repetition, follows 3 step commands. Extraocular movements intact, no nystagmus, visual fields full, face symmetric, tongue midline. No drift, 5/5 power x 4 extremities. Normal sensation to LT. Normal finger-to-nose and rapid alternating movements.    PAST MEDICAL & SURGICAL HISTORY:  Dissection, vertebral artery  2019 novel coronavirus disease (COVID-19)  Breast fibroadenoma  H/O:   Abnormal angiogram of head  Failed external cephalic version  S/P excision of fibroadenoma of breast    Social History:   Denies tobacco use    FAMILY HISTORY:  FH: hypothyroidism  FH: breast cancer (Aunt)  FH: prostate cancer (Uncle)    Allergies:   No Known Allergies    Current Medications:   · 	Aspirin Enteric Coated 325 mg oral delayed release tablet: Last Dose Taken:  , 1 tab(s) orally once a day       Labs:                         12.9   5.29  )-----------( 235      ( 21 May 2024 15:30 )             38.4       05-21    140  |  105  |  15  ----------------------------<  88  3.8   |  23  |  0.71      HCG: negative     Assessment/Plan:   This is a 40yo female  presents with 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 Patient presents to neuro-IR for selective cerebral angiography. Procedure/ risks/ benefits/ goals/ alternatives were explained. Risks include but are not limited to stroke/ vessel injury/ hemorrhage/ groin hematoma. All questions answered. Informed content obtained from patient. Consent placed in chart.

## 2024-05-28 NOTE — ASU DISCHARGE PLAN (ADULT/PEDIATRIC) - CARE PROVIDER_API CALL
Dale Mayer  Neurosurgery  [address] [facility], [address]  [place] [address]  Phone: [phone]  Fax: [phone]  Follow Up Time:

## 2024-05-29 ENCOUNTER — APPOINTMENT (OUTPATIENT)
Dept: PLASTIC SURGERY | Facility: CLINIC | Age: 39
End: 2024-05-29